# Patient Record
Sex: FEMALE | Race: OTHER | NOT HISPANIC OR LATINO | URBAN - METROPOLITAN AREA
[De-identification: names, ages, dates, MRNs, and addresses within clinical notes are randomized per-mention and may not be internally consistent; named-entity substitution may affect disease eponyms.]

---

## 2019-06-19 RX ORDER — CEFDINIR 250 MG/5ML
1 POWDER, FOR SUSPENSION ORAL
Qty: 0 | Refills: 0 | DISCHARGE
Start: 2019-06-19

## 2019-06-30 ENCOUNTER — INPATIENT (INPATIENT)
Facility: HOSPITAL | Age: 19
LOS: 3 days | Discharge: HOME | DRG: 392 | End: 2019-07-04
Attending: STUDENT IN AN ORGANIZED HEALTH CARE EDUCATION/TRAINING PROGRAM | Admitting: HOSPITALIST
Payer: MEDICAID

## 2019-06-30 VITALS
HEART RATE: 102 BPM | WEIGHT: 139.99 LBS | HEIGHT: 63 IN | DIASTOLIC BLOOD PRESSURE: 83 MMHG | RESPIRATION RATE: 22 BRPM | TEMPERATURE: 98 F | SYSTOLIC BLOOD PRESSURE: 119 MMHG

## 2019-06-30 LAB
ALBUMIN SERPL ELPH-MCNC: 5.1 G/DL — HIGH (ref 3.3–5)
ALP SERPL-CCNC: 69 U/L — SIGNIFICANT CHANGE UP (ref 40–120)
ALT FLD-CCNC: 50 U/L — HIGH (ref 10–45)
ANION GAP SERPL CALC-SCNC: 23 MMOL/L — HIGH (ref 5–17)
APTT BLD: 28.9 SEC — SIGNIFICANT CHANGE UP (ref 27.5–36.3)
AST SERPL-CCNC: 30 U/L — SIGNIFICANT CHANGE UP (ref 10–40)
BASOPHILS # BLD AUTO: 0.1 K/UL — SIGNIFICANT CHANGE UP (ref 0–0.2)
BASOPHILS NFR BLD AUTO: 0.4 % — SIGNIFICANT CHANGE UP (ref 0–2)
BILIRUB SERPL-MCNC: 0.8 MG/DL — SIGNIFICANT CHANGE UP (ref 0.2–1.2)
BUN SERPL-MCNC: 18 MG/DL — SIGNIFICANT CHANGE UP (ref 7–23)
CALCIUM SERPL-MCNC: 10 MG/DL — SIGNIFICANT CHANGE UP (ref 8.4–10.5)
CHLORIDE SERPL-SCNC: 96 MMOL/L — SIGNIFICANT CHANGE UP (ref 96–108)
CO2 SERPL-SCNC: 17 MMOL/L — LOW (ref 22–31)
CREAT SERPL-MCNC: 0.74 MG/DL — SIGNIFICANT CHANGE UP (ref 0.5–1.3)
EOSINOPHIL # BLD AUTO: 0.1 K/UL — SIGNIFICANT CHANGE UP (ref 0–0.5)
EOSINOPHIL NFR BLD AUTO: 0.4 % — SIGNIFICANT CHANGE UP (ref 0–6)
GAS PNL BLDV: SIGNIFICANT CHANGE UP
GLUCOSE SERPL-MCNC: 184 MG/DL — HIGH (ref 70–99)
HCT VFR BLD CALC: 42.8 % — SIGNIFICANT CHANGE UP (ref 34.5–45)
HGB BLD-MCNC: 14.8 G/DL — SIGNIFICANT CHANGE UP (ref 11.5–15.5)
HIV 1 & 2 AB SERPL IA.RAPID: SIGNIFICANT CHANGE UP
INR BLD: 1.26 RATIO — HIGH (ref 0.88–1.16)
LIDOCAIN IGE QN: 15 U/L — SIGNIFICANT CHANGE UP (ref 7–60)
LYMPHOCYTES # BLD AUTO: 10.1 % — LOW (ref 13–44)
LYMPHOCYTES # BLD AUTO: 2 K/UL — SIGNIFICANT CHANGE UP (ref 1–3.3)
MCHC RBC-ENTMCNC: 30.3 PG — SIGNIFICANT CHANGE UP (ref 27–34)
MCHC RBC-ENTMCNC: 34.6 GM/DL — SIGNIFICANT CHANGE UP (ref 32–36)
MCV RBC AUTO: 87.5 FL — SIGNIFICANT CHANGE UP (ref 80–100)
MONOCYTES # BLD AUTO: 1.3 K/UL — HIGH (ref 0–0.9)
MONOCYTES NFR BLD AUTO: 6.8 % — SIGNIFICANT CHANGE UP (ref 2–14)
NEUTROPHILS # BLD AUTO: 16.1 K/UL — HIGH (ref 1.8–7.4)
NEUTROPHILS NFR BLD AUTO: 82.3 % — HIGH (ref 43–77)
PLATELET # BLD AUTO: 388 K/UL — SIGNIFICANT CHANGE UP (ref 150–400)
POTASSIUM SERPL-MCNC: 3 MMOL/L — LOW (ref 3.5–5.3)
POTASSIUM SERPL-SCNC: 3 MMOL/L — LOW (ref 3.5–5.3)
PROT SERPL-MCNC: 8.3 G/DL — SIGNIFICANT CHANGE UP (ref 6–8.3)
PROTHROM AB SERPL-ACNC: 14.5 SEC — HIGH (ref 10–12.9)
RBC # BLD: 4.89 M/UL — SIGNIFICANT CHANGE UP (ref 3.8–5.2)
RBC # FLD: 11.8 % — SIGNIFICANT CHANGE UP (ref 10.3–14.5)
SODIUM SERPL-SCNC: 136 MMOL/L — SIGNIFICANT CHANGE UP (ref 135–145)
WBC # BLD: 19.6 K/UL — HIGH (ref 3.8–10.5)
WBC # FLD AUTO: 19.6 K/UL — HIGH (ref 3.8–10.5)

## 2019-06-30 PROCEDURE — 76705 ECHO EXAM OF ABDOMEN: CPT | Mod: 26,RT

## 2019-06-30 PROCEDURE — 99285 EMERGENCY DEPT VISIT HI MDM: CPT

## 2019-06-30 RX ORDER — KETOROLAC TROMETHAMINE 30 MG/ML
15 SYRINGE (ML) INJECTION ONCE
Refills: 0 | Status: DISCONTINUED | OUTPATIENT
Start: 2019-06-30 | End: 2019-06-30

## 2019-06-30 RX ORDER — ONDANSETRON 8 MG/1
4 TABLET, FILM COATED ORAL ONCE
Refills: 0 | Status: COMPLETED | OUTPATIENT
Start: 2019-06-30 | End: 2019-06-30

## 2019-06-30 RX ORDER — FAMOTIDINE 10 MG/ML
20 INJECTION INTRAVENOUS ONCE
Refills: 0 | Status: COMPLETED | OUTPATIENT
Start: 2019-06-30 | End: 2019-06-30

## 2019-06-30 RX ORDER — SODIUM CHLORIDE 9 MG/ML
2000 INJECTION, SOLUTION INTRAVENOUS ONCE
Refills: 0 | Status: COMPLETED | OUTPATIENT
Start: 2019-06-30 | End: 2019-06-30

## 2019-06-30 RX ORDER — LIDOCAINE 4 G/100G
10 CREAM TOPICAL ONCE
Refills: 0 | Status: COMPLETED | OUTPATIENT
Start: 2019-06-30 | End: 2019-06-30

## 2019-06-30 RX ORDER — ACETAMINOPHEN 500 MG
975 TABLET ORAL ONCE
Refills: 0 | Status: COMPLETED | OUTPATIENT
Start: 2019-06-30 | End: 2019-06-30

## 2019-06-30 RX ADMIN — Medication 15 MILLIGRAM(S): at 22:02

## 2019-06-30 RX ADMIN — Medication 30 MILLILITER(S): at 22:00

## 2019-06-30 RX ADMIN — SODIUM CHLORIDE 2000 MILLILITER(S): 9 INJECTION, SOLUTION INTRAVENOUS at 22:03

## 2019-06-30 RX ADMIN — Medication 975 MILLIGRAM(S): at 22:40

## 2019-06-30 RX ADMIN — Medication 975 MILLIGRAM(S): at 22:01

## 2019-06-30 RX ADMIN — LIDOCAINE 10 MILLILITER(S): 4 CREAM TOPICAL at 22:00

## 2019-06-30 RX ADMIN — Medication 15 MILLIGRAM(S): at 22:40

## 2019-06-30 RX ADMIN — FAMOTIDINE 20 MILLIGRAM(S): 10 INJECTION INTRAVENOUS at 22:01

## 2019-06-30 RX ADMIN — ONDANSETRON 4 MILLIGRAM(S): 8 TABLET, FILM COATED ORAL at 22:08

## 2019-06-30 NOTE — ED PROVIDER NOTE - ATTENDING CONTRIBUTION TO CARE
I performed a history and physical exam of the patient and discussed their management with the resident. I reviewed the resident's note and agree with the documented findings and plan of care.  Wendy Malik MD

## 2019-06-30 NOTE — ED ADULT NURSE NOTE - OBJECTIVE STATEMENT
18 year old female comes to the ED c/o sever abdominal pain. Patient has no pertinent PMH. Patient states she began to have a severe epigastric/RUQ abdominal pain that began this afternoon and has been becoming increasingly worse. Patient's pain is associated with nausea and vomiting as well. Patient states she is unable to tolerate PO food/drink. Patient has recently visited [facility] for the same chief complain and was told it was "food poisoning and a UTI." Patient placed on Keflex and states she is compliant with the medication regiment. Upon exam, patient is upset and uncomfortable in appearance, VSS, tenderness noted to the RUQ and epigastric region, ambulatory, able to move all extremities, follow commands and guarding her abdomen. Lung sounds are clear and equal b/l with no labored breathing,  abdomen is soft, nondistended and tender upon palpation, peripheral pulses are strong and equal b/l with no edema noted. Patient denies CP/SOB, dizziness/lightheadedness/vision changes, numbness/tingling/weakness, hematuria/dysuria/frequency.

## 2019-06-30 NOTE — ED PROVIDER NOTE - CARE PLAN
Principal Discharge DX:	Epigastric pain  Secondary Diagnosis:	UTI (urinary tract infection)  Secondary Diagnosis:	Nausea and vomiting

## 2019-06-30 NOTE — ED ADULT NURSE NOTE - NSIMPLEMENTINTERV_GEN_ALL_ED
Implemented All Universal Safety Interventions:  [place] to call system. Call bell, personal items and telephone within reach. Instruct patient to call for assistance. Room bathroom lighting operational. Non-slip footwear when patient is off stretcher. Physically safe environment: no spills, clutter or unnecessary equipment. Stretcher in lowest position, wheels locked, appropriate side rails in place.

## 2019-06-30 NOTE — ED PROVIDER NOTE - NS_ ATTENDINGSCRIBEDETAILS _ED_A_ED_FT
I performed a history and physical exam of the patient and discussed their management with the resident. I reviewed the scribe's note and agree with the documented findings and plan of care.  Wendy Malik MD

## 2019-06-30 NOTE — ED PROVIDER NOTE - OBJECTIVE STATEMENT
19 y/o F with no PMHx asthma, clamydia  c/o  epigastric intermittent  abd pain 4 month related with menstrual cycle. However the past 2 weeks  shes had the pain intermittent non related to menstruation. Pt is epigastric with N/V, worse with PO. Has been at [place] [facility] greater than 4x diagnosed as food borne illness within first 2 visit had CT with enlarged liver, told she has pyelo and started on abx ( unaware of name) .  Currently Abd pain episode started this afternoon epigastric non-radiating and similar to prior episode. Denies fever, chills, Chest pain, SOB, vaginal bleeding, vaginal discharge, diarrhea, vomiting non-bloody non billiasd no prior abd surgeries.

## 2019-06-30 NOTE — ED PROVIDER NOTE - CLINICAL SUMMARY MEDICAL DECISION MAKING FREE TEXT BOX
19 y/o F with epigastric abd pain, N/C with tenderness to palpitation pancreatitis, coelitis, gastritis . UA to eval for UTI, Urine culture if culture persistent pain non-localizable plan to do CT scan for further eval. 17 y/o F with epigastric abd pain, N/C with tenderness to palpitation pancreatitis, coelitis, gastritis . UA to eval for UTI, Urine culture if culture persistent pain non-localizable plan to do CT scan for further eval.  Wendy Malik MD  Plan: labs, IV fluids, UA, UCG, lipase, US GB, reassess.

## 2019-06-30 NOTE — ED ADULT NURSE NOTE - CHPI ED NUR SYMPTOMS NEG
no abdominal distension/no fever/no hematuria/no blood in stool/no burning urination/no diarrhea/no dysuria

## 2019-07-01 DIAGNOSIS — R10.13 EPIGASTRIC PAIN: ICD-10-CM

## 2019-07-01 DIAGNOSIS — N12 TUBULO-INTERSTITIAL NEPHRITIS, NOT SPECIFIED AS ACUTE OR CHRONIC: ICD-10-CM

## 2019-07-01 DIAGNOSIS — Z91.89 OTHER SPECIFIED PERSONAL RISK FACTORS, NOT ELSEWHERE CLASSIFIED: ICD-10-CM

## 2019-07-01 DIAGNOSIS — R11.2 NAUSEA WITH VOMITING, UNSPECIFIED: ICD-10-CM

## 2019-07-01 DIAGNOSIS — Z29.9 ENCOUNTER FOR PROPHYLACTIC MEASURES, UNSPECIFIED: ICD-10-CM

## 2019-07-01 DIAGNOSIS — E87.2 ACIDOSIS: ICD-10-CM

## 2019-07-01 DIAGNOSIS — R65.10 SYSTEMIC INFLAMMATORY RESPONSE SYNDROME (SIRS) OF NON-INFECTIOUS ORIGIN WITHOUT ACUTE ORGAN DYSFUNCTION: ICD-10-CM

## 2019-07-01 DIAGNOSIS — J45.909 UNSPECIFIED ASTHMA, UNCOMPLICATED: ICD-10-CM

## 2019-07-01 LAB
ANION GAP SERPL CALC-SCNC: 12 MMOL/L — SIGNIFICANT CHANGE UP (ref 5–17)
APPEARANCE UR: ABNORMAL
BACTERIA # UR AUTO: NEGATIVE — SIGNIFICANT CHANGE UP
BILIRUB UR-MCNC: NEGATIVE — SIGNIFICANT CHANGE UP
BUN SERPL-MCNC: 10 MG/DL — SIGNIFICANT CHANGE UP (ref 7–23)
CALCIUM SERPL-MCNC: 8.9 MG/DL — SIGNIFICANT CHANGE UP (ref 8.4–10.5)
CHLORIDE SERPL-SCNC: 101 MMOL/L — SIGNIFICANT CHANGE UP (ref 96–108)
CO2 SERPL-SCNC: 25 MMOL/L — SIGNIFICANT CHANGE UP (ref 22–31)
COLOR SPEC: YELLOW — SIGNIFICANT CHANGE UP
CREAT SERPL-MCNC: 0.6 MG/DL — SIGNIFICANT CHANGE UP (ref 0.5–1.3)
DIFF PNL FLD: NEGATIVE — SIGNIFICANT CHANGE UP
EPI CELLS # UR: 4 /HPF — SIGNIFICANT CHANGE UP
GAS PNL BLDV: SIGNIFICANT CHANGE UP
GLUCOSE SERPL-MCNC: 101 MG/DL — HIGH (ref 70–99)
GLUCOSE UR QL: NEGATIVE — SIGNIFICANT CHANGE UP
HCG UR QL: NEGATIVE — SIGNIFICANT CHANGE UP
HYALINE CASTS # UR AUTO: 13 /LPF — HIGH (ref 0–2)
KETONES UR-MCNC: ABNORMAL
LACTATE SERPL-SCNC: 1 MMOL/L — SIGNIFICANT CHANGE UP (ref 0.7–2)
LEUKOCYTE ESTERASE UR-ACNC: ABNORMAL
MAGNESIUM SERPL-MCNC: 1.7 MG/DL — SIGNIFICANT CHANGE UP (ref 1.6–2.6)
NITRITE UR-MCNC: NEGATIVE — SIGNIFICANT CHANGE UP
PCP SPEC-MCNC: SIGNIFICANT CHANGE UP
PH UR: 8 — SIGNIFICANT CHANGE UP (ref 5–8)
PHOSPHATE SERPL-MCNC: 3.1 MG/DL — SIGNIFICANT CHANGE UP (ref 2.5–4.5)
POTASSIUM SERPL-MCNC: 3.2 MMOL/L — LOW (ref 3.5–5.3)
POTASSIUM SERPL-SCNC: 3.2 MMOL/L — LOW (ref 3.5–5.3)
PROT UR-MCNC: ABNORMAL
RBC CASTS # UR COMP ASSIST: 8 /HPF — HIGH (ref 0–4)
SODIUM SERPL-SCNC: 138 MMOL/L — SIGNIFICANT CHANGE UP (ref 135–145)
SP GR SPEC: 1.03 — HIGH (ref 1.01–1.02)
UROBILINOGEN FLD QL: NEGATIVE — SIGNIFICANT CHANGE UP
WBC UR QL: 95 /HPF — HIGH (ref 0–5)

## 2019-07-01 PROCEDURE — 99223 1ST HOSP IP/OBS HIGH 75: CPT | Mod: GC

## 2019-07-01 PROCEDURE — 76830 TRANSVAGINAL US NON-OB: CPT | Mod: 26

## 2019-07-01 PROCEDURE — 76856 US EXAM PELVIC COMPLETE: CPT | Mod: 26,59

## 2019-07-01 PROCEDURE — 93010 ELECTROCARDIOGRAM REPORT: CPT

## 2019-07-01 PROCEDURE — 74177 CT ABD & PELVIS W/CONTRAST: CPT | Mod: 26

## 2019-07-01 PROCEDURE — 93975 VASCULAR STUDY: CPT | Mod: 26

## 2019-07-01 RX ORDER — PANTOPRAZOLE SODIUM 20 MG/1
40 TABLET, DELAYED RELEASE ORAL
Refills: 0 | Status: DISCONTINUED | OUTPATIENT
Start: 2019-07-01 | End: 2019-07-04

## 2019-07-01 RX ORDER — MORPHINE SULFATE 50 MG/1
4 CAPSULE, EXTENDED RELEASE ORAL ONCE
Refills: 0 | Status: DISCONTINUED | OUTPATIENT
Start: 2019-07-01 | End: 2019-07-01

## 2019-07-01 RX ORDER — IPRATROPIUM/ALBUTEROL SULFATE 18-103MCG
3 AEROSOL WITH ADAPTER (GRAM) INHALATION EVERY 6 HOURS
Refills: 0 | Status: DISCONTINUED | OUTPATIENT
Start: 2019-07-01 | End: 2019-07-04

## 2019-07-01 RX ORDER — POTASSIUM CHLORIDE 20 MEQ
10 PACKET (EA) ORAL
Refills: 0 | Status: COMPLETED | OUTPATIENT
Start: 2019-07-01 | End: 2019-07-01

## 2019-07-01 RX ORDER — SODIUM CHLORIDE 9 MG/ML
1000 INJECTION INTRAMUSCULAR; INTRAVENOUS; SUBCUTANEOUS
Refills: 0 | Status: DISCONTINUED | OUTPATIENT
Start: 2019-07-01 | End: 2019-07-02

## 2019-07-01 RX ORDER — SODIUM CHLORIDE 9 MG/ML
1000 INJECTION, SOLUTION INTRAVENOUS ONCE
Refills: 0 | Status: COMPLETED | OUTPATIENT
Start: 2019-07-01 | End: 2019-07-01

## 2019-07-01 RX ORDER — KETOROLAC TROMETHAMINE 30 MG/ML
15 SYRINGE (ML) INJECTION ONCE
Refills: 0 | Status: DISCONTINUED | OUTPATIENT
Start: 2019-07-01 | End: 2019-07-01

## 2019-07-01 RX ORDER — CEFPODOXIME PROXETIL 100 MG
200 TABLET ORAL
Refills: 0 | Status: DISCONTINUED | OUTPATIENT
Start: 2019-07-01 | End: 2019-07-04

## 2019-07-01 RX ORDER — PANTOPRAZOLE SODIUM 20 MG/1
1 TABLET, DELAYED RELEASE ORAL
Qty: 0 | Refills: 0 | DISCHARGE

## 2019-07-01 RX ORDER — MAGNESIUM SULFATE 500 MG/ML
2 VIAL (ML) INJECTION ONCE
Refills: 0 | Status: COMPLETED | OUTPATIENT
Start: 2019-07-01 | End: 2019-07-01

## 2019-07-01 RX ORDER — NICOTINE POLACRILEX 2 MG
1 GUM BUCCAL DAILY
Refills: 0 | Status: DISCONTINUED | OUTPATIENT
Start: 2019-07-01 | End: 2019-07-04

## 2019-07-01 RX ORDER — POTASSIUM CHLORIDE 20 MEQ
40 PACKET (EA) ORAL EVERY 4 HOURS
Refills: 0 | Status: COMPLETED | OUTPATIENT
Start: 2019-07-01 | End: 2019-07-02

## 2019-07-01 RX ADMIN — Medication 15 MILLIGRAM(S): at 08:06

## 2019-07-01 RX ADMIN — SODIUM CHLORIDE 1000 MILLILITER(S): 9 INJECTION, SOLUTION INTRAVENOUS at 03:20

## 2019-07-01 RX ADMIN — Medication 100 MILLIEQUIVALENT(S): at 21:50

## 2019-07-01 RX ADMIN — MORPHINE SULFATE 4 MILLIGRAM(S): 50 CAPSULE, EXTENDED RELEASE ORAL at 01:51

## 2019-07-01 RX ADMIN — SODIUM CHLORIDE 150 MILLILITER(S): 9 INJECTION INTRAMUSCULAR; INTRAVENOUS; SUBCUTANEOUS at 13:46

## 2019-07-01 RX ADMIN — Medication 40 MILLIEQUIVALENT(S): at 18:27

## 2019-07-01 RX ADMIN — Medication 50 GRAM(S): at 13:47

## 2019-07-01 RX ADMIN — PANTOPRAZOLE SODIUM 40 MILLIGRAM(S): 20 TABLET, DELAYED RELEASE ORAL at 13:45

## 2019-07-01 RX ADMIN — MORPHINE SULFATE 4 MILLIGRAM(S): 50 CAPSULE, EXTENDED RELEASE ORAL at 02:00

## 2019-07-01 RX ADMIN — Medication 200 MILLIGRAM(S): at 21:50

## 2019-07-01 RX ADMIN — Medication 40 MILLIEQUIVALENT(S): at 13:46

## 2019-07-01 NOTE — H&P ADULT - ASSESSMENT
19 y/o F PMHx of migraines, mild intermittent asthma and Chlamydia infection (2 years ago) who presents with 4 months of intermittent epigastric pain.

## 2019-07-01 NOTE — CONSULT NOTE ADULT - ASSESSMENT
Aminata Leach is a 17 y/o woman w/ past hx significant for migraines who presents with 4 months of intermittent epigastric pain, nausea, and vomiting. Of note she has been hospitalized 5-6 times at numerous hospitals in the area over the past few months, most recently [facility] 2 weeks ago. Aminata Leach is a 17 y/o woman w/ past hx significant for migraines who presents with 4 months of intermittent epigastric pain, nausea, and vomiting of unclear etiology w/o any significant findings on EGD, gastric emptying study, or imaging. Of note she has been hospitalized 5-6 times at numerous hospitals in the area over the past few months, most recently [facility] 2 weeks ago, where she was treated for pyelonephritis.     At this time, the patient's GI symptoms are of unclear etiology. She has had an extensive workup between everything that has been done at outside hospitals and during this admission, which has been non-revelatory. She does have a leukocytosis (may be attributable to pyelonephritis but has been on antibiotics albeit intermittently for a couple weeks now) and mildly elevated LFTs, pointing to some organic process, though the most common GI causes (cholecystitis, pancreatitis, gastroparesis) have been effectively ruled out. Other etiologies that could explain her symptoms include mastocytosis, lead toxicity, hereditary angioedema, or porphyria. At this time, would recommend completing imaging workup with HIDA scan and sending labs to evaluate for more rare etiologies. Furthermore, her symptoms were initially associated with her menstrual periods, which could point to endometriosis.    Recs:  -HIDA scan  -Blood tests: Lead, tTG IgA, C1 esterase inhibitor, tryptase, uroporphyrinogen decarboxylase  -Consider GYN consult re: endometriosis  -continue PPI Aminata Leach is a 17 y/o woman w/ past hx significant for migraines who presents with 4 months of intermittent epigastric pain, nausea, and vomiting of unclear etiology w/o any significant findings on EGD, gastric emptying study, or imaging. Of note she has been hospitalized 5-6 times at numerous hospitals in the area over the past few months, most recently [facility] 2 weeks ago, where she was treated for pyelonephritis.     At this time, the patient's GI symptoms are of unclear etiology. She has had an extensive workup between everything that has been done at outside hospitals and during this admission, which has been non-revelatory. She does have a leukocytosis (may be attributable to pyelonephritis but has been on antibiotics albeit intermittently for a couple weeks now) and mildly elevated LFTs, pointing to some organic process, though the most common GI causes (cholecystitis, pancreatitis, gastroparesis) have been effectively ruled out. Other etiologies that could explain her symptoms include mastocytosis, lead toxicity, hereditary angioedema, or porphyria. At this time, would recommend completing imaging workup with HIDA scan and sending labs to evaluate for more rare etiologies. Furthermore, her symptoms were initially associated with her menstrual periods, which could point to endometriosis.      Recommend:  -Obtain [facility] HIDA to rule out biliary dyskinesia  -Check urine porphobilinogen, serum lead, C1 esterase, C3, C4, tryptase, transglutaminase antibodies with IgA  -Obtain records from [place] and [facility] for review  -Finish course of antibiotics for pyelonephritis  -Continue to hold marijuana use  -If all the above are negative, also consider GYN consultation for endometritosis or GYN causes, as pain correlated with menses

## 2019-07-01 NOTE — H&P ADULT - PROBLEM SELECTOR PLAN 7
No PCP per patient  [facility]:  [phone] No PCP per patient  [facility]:  [phone]

## 2019-07-01 NOTE — H&P ADULT - PROBLEM SELECTOR PLAN 5
For HR and Leukocytosis.  -No clear source of infection. Symptoms IMPROVE score 0. SCDs for DVT ppx    #FEN  -S/p 3L LR in ED. C/w NS at 150 cc/hr.   -Replete lytes prn.  -Regular diet. Mild intermittent.   -Duonebs prn.

## 2019-07-01 NOTE — CONSULT NOTE ADULT - ATTENDING COMMENTS
Patient seen and examined. Agree with above. Patient has had 4 months of abdominal pain in the epigastrium associated with nausea/vomiting, started and worse with menses. She has had negative EGD, CT/ultrasound of abdomen, as well as gastric emptying study. Given the slight elevation in ALT along with the pain, check [facility] HIDA to rule out biliary dyskinesia. Check the above serological and urine workup for rare causes of abdominal pain. Obtain records for review from [place] and [facility].     If the above are negative, consider GYN consultation.

## 2019-07-01 NOTE — H&P ADULT - PROBLEM SELECTOR PLAN 2
Mild intermittent.   -Duonebs prn. Patient has been on PO antibiotics for outpatient treatment of pyelonephritis. Currently no CVA tenderness and no signs of recurrent pyelonephritis or abscess on CT a/p with IV contrast.   -Will continue alternative to cefdinir for completion of regimen. Stop date added.

## 2019-07-01 NOTE — H&P ADULT - NSHPPHYSICALEXAM_GEN_ALL_CORE
T(C): 36.8 (07-01-19 @ 10:30), Max: 37.2 (07-01-19 @ 09:24)  T(F): 98.2 (07-01-19 @ 10:30), Max: 98.9 (07-01-19 @ 09:24)  HR: 95 (07-01-19 @ 10:30) (87 - 109)  BP: 101/65 (07-01-19 @ 10:30) (101/65 - 140/89)  RR: 18 (07-01-19 @ 10:30) (16 - 24)  SpO2: 99% (07-01-19 @ 10:30) (96% - 100%)    CONSTITUTIONAL: No acute distress. Speaking in full sentences.   HEENT:  Head atraumatic. Conjunctiva clear B/L. Nasal mucosa normal. Moist oral mucosa. No posterior pharyngeal lesions noted.  Cardiovascular: RRR with no murmurs. No JVD noted. No lower extremitiy edema B/L.  Respiratory: Lungs CTAB. No accessory muscle use. Speaking in full sentences.  Gastrointestinal:  Soft, nontender. Non-distended. Non-rigid. No CVA tenderness B/L.  MSK:  No joint swelling. No joint erythema B/L. No midline spinal tenderness.  Vascular: Radial pulses 2+ B/L. Dorsalis pedis pulses 2+ B/L.  Neurologic:  Alert and awake. Oriented x3. Moving all extremities. Following commands. Making eye contact. No focal deficits.  Skin:  No rashes noted. No skin erythema noted. Extremities warm and well perfused throughout.  Psych:  Normal affect. Normal Mood.

## 2019-07-01 NOTE — H&P ADULT - NSHPSOCIALHISTORY_GEN_ALL_CORE
Used to smoke marijuana habitually. Smokes "6 blunts a day for years"  Smokes 5-6 cigarettes a week for about 2 years  Denies alcohol use.

## 2019-07-01 NOTE — CONSULT NOTE ADULT - SUBJECTIVE AND OBJECTIVE BOX
Mulugeta Escobar   PGY-1    Chief Complaint:  Patient is a 18y old  Female who presents with a chief complaint of Epigastric pain, Nausea, Vomiting, and  Intolerance of PO (2019 12:22)    Reason for consult: evaluate cause of abdominal pain and vomiting    HPI:  Aminata Leach is a 19 y/o woman w/ past hx significant for migraines who presents with 4 months of intermittent epigastric pain, nausea, and vomiting. Of note she has been hospitalized 5-6 times at numerous hospitals in the area over the past few months, most recently [facility] 2 weeks ago. During that admission, she was noted to have pyelonephritis and discharged on po antibiotics.     Ms. Leach states that the pain had been associated with her menstrual periods, but for the past month her symptoms have been more persistent, lasting for anywhere from a few hours to days. Prior to 4 months ago, she had never experienced similar episodes, which are typified by non-radiating epigastric pain that starts as mild, slowly worsens, and ultimately causes nausea and vomiting. She sometimes notes food from the previous day in her vomit and denies ever seeing blood. Her appetite has been poor, and food/drink make her symptoms worse. She has not been able to keep much down over the past month and has lost around 15 lbs.     At [facility], she reports that her workup included an EGD and gastric emptying study, both of which were normal. She reported smoking marijuana frequently and was told that she could have cannabis hyperemesis syndrome or another cyclic vomiting syndrome. Since that time around 2 weeks ago, she has not smoked any marijuana. She denies alcohol or other drug use.     Workup on this admission has included CT abdomen/pelvis, US RUQ, US pelvis, and transvaginal US, all of which have been unremarkable.              Allergies:  tramadol (Drowsiness)      Home Medications:    Hospital Medications:  ALBUTerol/ipratropium for Nebulization 3 milliLiter(s) Nebulizer every 6 hours PRN  cefpodoxime 200 milliGRAM(s) Oral two times a day  pantoprazole    Tablet 40 milliGRAM(s) Oral before breakfast  potassium chloride    Tablet ER 40 milliEquivalent(s) Oral every 4 hours  potassium chloride  10 mEq/100 mL IVPB 10 milliEquivalent(s) IV Intermittent every 1 hour  sodium chloride 0.9%. 1000 milliLiter(s) IV Continuous <Continuous>      PMHX/PSHX:  Chlamydia  Asthma  No significant past surgical history      Family history:  No significant family history      Social History:     ROS:     General:  No wt loss, fevers, chills, night sweats, fatigue,   Eyes:  Good vision, no reported pain  ENT:  No sore throat, pain, runny nose, dysphagia  CV:  No pain, palpitations, hypo/hypertension  Resp:  No dyspnea, cough, tachypnea, wheezing  GI:  See HPI  :  No pain, bleeding, incontinence, nocturia  Muscle:  No pain, weakness  Neuro:  No weakness, tingling, memory problems  Psych:  No fatigue, insomnia, mood problems, depression  Endocrine:  No polyuria, polydipsia, cold/heat intolerance  Heme:  No petechiae, ecchymosis, easy bruisability  Skin:  No rash, edema      PHYSICAL EXAM:     GENERAL:  Appears stated age, well-groomed, well-nourished, no distress  HEENT:  NC/AT,  conjunctivae clear and pink,  no JVD  CHEST:  Full & symmetric excursion, no increased effort, breath sounds clear  HEART:  Regular rhythm, S1, S2, no murmur/rub/S3/S4, no abdominal bruit, no edema  ABDOMEN:  Soft, non-tender, non-distended, normoactive bowel sounds,  no masses ,  EXTREMITIES:  no cyanosis,clubbing or edema  SKIN:  No rash/erythema/ecchymoses/petechiae/wounds/abscess/warm/dry  NEURO:  Alert, oriented    Vital Signs:  Vital Signs Last 24 Hrs  T(C): 36.8 (2019 10:30), Max: 37.2 (2019 09:24)  T(F): 98.2 (2019 10:30), Max: 98.9 (2019 09:24)  HR: 95 (2019 10:30) (87 - 109)  BP: 101/65 (2019 10:30) (101/65 - 140/89)  BP(mean): --  RR: 18 (2019 10:30) (16 - 24)  SpO2: 99% (2019 10:30) (96% - 100%)  Daily Height in cm: 160.02 (2019 10:30)    Daily     LABS:                        14.8   19.6  )-----------( 388      ( 2019 22:32 )             42.8     07-    138  |  101  |  10  ----------------------------<  101<H>  3.2<L>   |  25  |  0.60    Ca    8.9      2019 13:45  Phos  3.1     07-  Mg     1.7         TPro  8.3  /  Alb  5.1<H>  /  TBili  0.8  /  DBili  x   /  AST  30  /  ALT  50<H>  /  AlkPhos  69  06-30    LIVER FUNCTIONS - ( 2019 22:32 )  Alb: 5.1 g/dL / Pro: 8.3 g/dL / ALK PHOS: 69 U/L / ALT: 50 U/L / AST: 30 U/L / GGT: x           PT/INR - ( 2019 22:32 )   PT: 14.5 sec;   INR: 1.26 ratio         PTT - ( 2019 22:32 )  PTT:28.9 sec  Urinalysis Basic - ( 2019 00:21 )    Color: Yellow / Appearance: Slightly Turbid / S.033 / pH: x  Gluc: x / Ketone: Large  / Bili: Negative / Urobili: Negative   Blood: x / Protein: 30 mg/dL / Nitrite: Negative   Leuk Esterase: Large / RBC: 8 /hpf / WBC 95 /HPF   Sq Epi: x / Non Sq Epi: 4 /hpf / Bacteria: Negative      Amylase Serum--      Lipase serum15       Ammonia--      Imaging: Mulugeta Escobar   PGY-1    Chief Complaint:  Patient is a 18y old  Female who presents with a chief complaint of Epigastric pain, Nausea, Vomiting, and  Intolerance of PO (2019 12:22)    Reason for consult: evaluate cause of abdominal pain and vomiting    HPI:  Aminata Leach is a 19 y/o woman w/ past hx significant for migraines who presents with 4 months of intermittent epigastric pain, nausea, and vomiting. Of note she has been hospitalized 5-6 times at numerous hospitals in the area over the past few months, most recently [facility] 2 weeks ago. During that admission, she was noted to have pyelonephritis and discharged on po antibiotics.     Ms. Leach states that the pain had been associated with her menstrual periods, but for the past month her symptoms have been more persistent, lasting for anywhere from a few hours to days. Prior to 4 months ago, she had never experienced similar episodes, which are typified by non-radiating epigastric pain that starts as mild, slowly worsens, and ultimately causes nausea and vomiting. She sometimes notes food from the previous day in her vomit and denies ever seeing blood. Her appetite has been poor, and food/drink make her symptoms worse. She has not been able to keep much down over the past month and has lost around 15 lbs.     At [facility], she reports that her workup included an EGD and gastric emptying study, both of which were normal. She reported smoking marijuana frequently and was told that she could have cannabis hyperemesis syndrome or another cyclic vomiting syndrome. Since that time around 2 weeks ago, she has not smoked any marijuana. She denies alcohol or other drug use. Hot showers do not improve her symptoms.     Workup on this admission has included CT abdomen/pelvis, US RUQ, US pelvis, and transvaginal US, all of which have been unremarkable.              Allergies:  tramadol (Drowsiness)      Home Medications:    Hospital Medications:  ALBUTerol/ipratropium for Nebulization 3 milliLiter(s) Nebulizer every 6 hours PRN  cefpodoxime 200 milliGRAM(s) Oral two times a day  pantoprazole    Tablet 40 milliGRAM(s) Oral before breakfast  potassium chloride    Tablet ER 40 milliEquivalent(s) Oral every 4 hours  potassium chloride  10 mEq/100 mL IVPB 10 milliEquivalent(s) IV Intermittent every 1 hour  sodium chloride 0.9%. 1000 milliLiter(s) IV Continuous <Continuous>      PMHX/PSHX:  Chlamydia  Asthma  No significant past surgical history      Family history:  No significant family history      Social History:   -stopped smoking marijuana 2 weeks ago    ROS:     General:  + wt loss, +chills,    Eyes:  Good vision, no reported pain  ENT:  No sore throat, pain, runny nose, dysphagia  CV:  No pain, palpitations, hypo/hypertension  Resp:  No dyspnea, cough, tachypnea, wheezing  GI:  See HPI  :  No pain, bleeding, incontinence, nocturia  MSK:  no pain, noweakness  Neuro:  numbness in limbs rarely, No weakness, tingling, memory problems  Psych:  +anxiety, No fatigue, insomnia, mood problems, depression  Endocrine:  No polyuria, polydipsia, cold/heat intolerance  Heme:  No petechiae, ecchymosis, easy bruisability  Skin:  No rash, edema      PHYSICAL EXAM:     GENERAL:  Appears stated age, well-groomed, well-nourished, no distress  HEENT:  NC/AT,  conjunctivae clear and pink  CHEST:  Full & symmetric excursion, no increased effort, breath sounds clear  HEART:  Regular rhythm, S1, S2, no murmur/rub/S3/S4, no abdominal bruit, no edema  ABDOMEN:  + epigastric tenderness to palpation, Soft, non-distended, normoactive bowel sounds,  no masses ,  EXTREMITIES:  no cyanosis,clubbing or edema  SKIN:  No rash/erythema/ecchymoses/petechiae/wounds/abscess/warm/dry  NEURO:  Alert, oriented    Vital Signs:  Vital Signs Last 24 Hrs  T(C): 36.8 (2019 10:30), Max: 37.2 (2019 09:24)  T(F): 98.2 (2019 10:30), Max: 98.9 (2019 09:24)  HR: 95 (2019 10:30) (87 - 109)  BP: 101/65 (2019 10:30) (101/65 - 140/89)  BP(mean): --  RR: 18 (2019 10:30) (16 - 24)  SpO2: 99% (2019 10:30) (96% - 100%)  Daily Height in cm: 160.02 (2019 10:30)    Daily     LABS:                        14.8   19.6  )-----------( 388      ( 2019 22:32 )             42.8         138  |  101  |  10  ----------------------------<  101<H>  3.2<L>   |  25  |  0.60    Ca    8.9      2019 13:45  Phos  3.1       Mg     1.7         TPro  8.3  /  Alb  5.1<H>  /  TBili  0.8  /  DBili  x   /  AST  30  /  ALT  50<H>  /  AlkPhos  69      LIVER FUNCTIONS - ( 2019 22:32 )  Alb: 5.1 g/dL / Pro: 8.3 g/dL / ALK PHOS: 69 U/L / ALT: 50 U/L / AST: 30 U/L / GGT: x           PT/INR - ( 2019 22:32 )   PT: 14.5 sec;   INR: 1.26 ratio         PTT - ( 2019 22:32 )  PTT:28.9 sec  Urinalysis Basic - ( 2019 00:21 )    Color: Yellow / Appearance: Slightly Turbid / S.033 / pH: x  Gluc: x / Ketone: Large  / Bili: Negative / Urobili: Negative   Blood: x / Protein: 30 mg/dL / Nitrite: Negative   Leuk Esterase: Large / RBC: 8 /hpf / WBC 95 /HPF   Sq Epi: x / Non Sq Epi: 4 /hpf / Bacteria: Negative      Amylase Serum--      Lipase serum15       Ammonia--      Imaging:      FINDINGS:     CT ABD/Pelvis  LOWER CHEST: Within normal limits.    LIVER: Within normal limits.  BILE DUCTS: Normal caliber.  GALLBLADDER: Within normal limits.  SPLEEN: Within normal limits.  PANCREAS: Within normal limits.  ADRENALS: Within normal limits.  KIDNEYS/URETERS: Within normal limits.    BLADDER: Within normal limits.  REPRODUCTIVE ORGANS: Involuting left corpus luteum.    BOWEL: No bowel obstruction.  Normal appendix.  PERITONEUM: Small amount of pelvic free fluid.  VESSELS:  Within normal limits.  RETROPERITONEUM: No lymphadenopathy.    ABDOMINAL WALL: Within normal limits.  BONES: Within normal limits.    IMPRESSION:     No CT evidence of bowel obstruction, active inflammatory process or   transabdominal source for infection.    Involuting left corpus luteum.  Small amount of pelvic free fluid. Mulugeta Escobar   PGY-1    Chief Complaint:  Patient is a 18y old  Female who presents with a chief complaint of Epigastric pain, Nausea, Vomiting, and  Intolerance of PO (2019 12:22)    Reason for consult: evaluate cause of abdominal pain and vomiting    HPI:  Aminata Leach is a 17 y/o woman w/ past hx significant for migraines who presents with 4 months of intermittent epigastric pain, nausea, and vomiting. Of note she has been hospitalized 5-6 times at numerous hospitals in the area over the past few months, most recently [facility] 2 weeks ago. During that admission, she was noted to have pyelonephritis and discharged on po antibiotics.     Ms. Leach states that the pain had been associated with her menstrual periods, but for the past month her symptoms have been more persistent, lasting for anywhere from a few hours to days. Prior to 4 months ago, she had never experienced similar episodes, which are typified by non-radiating epigastric pain that starts as mild, slowly worsens, and ultimately causes nausea and vomiting. She sometimes notes food from the previous day in her vomit and denies ever seeing blood. Her appetite has been poor, and food/drink make her symptoms worse. She has not been able to keep much down over the past month and has lost around 15 lbs.  She endores that the pain first started at the same time of her menses, and over the last 4 months whenever she had a period the pain and nausea was worse.    At [facility], she reports that her workup included an EGD and gastric emptying study, both of which were normal. She reported smoking marijuana frequently and was told that she could have cannabis hyperemesis syndrome or another cyclic vomiting syndrome. Since that time around 3 weeks ago, she has not smoked any marijuana. She denies alcohol or other drug use. Hot showers do not improve her symptoms.     Workup on this admission has included CT abdomen/pelvis, US RUQ, US pelvis, and transvaginal US, all of which have been unremarkable.              Allergies:  tramadol (Drowsiness)      Home Medications:    Hospital Medications:  ALBUTerol/ipratropium for Nebulization 3 milliLiter(s) Nebulizer every 6 hours PRN  cefpodoxime 200 milliGRAM(s) Oral two times a day  pantoprazole    Tablet 40 milliGRAM(s) Oral before breakfast  potassium chloride    Tablet ER 40 milliEquivalent(s) Oral every 4 hours  potassium chloride  10 mEq/100 mL IVPB 10 milliEquivalent(s) IV Intermittent every 1 hour  sodium chloride 0.9%. 1000 milliLiter(s) IV Continuous <Continuous>      PMHX/PSHX:  Chlamydia  Asthma  No significant past surgical history      Family history:  No significant family history      Social History:   -stopped smoking marijuana 2 weeks ago  -No ETOH or smoking    ROS:     General:  + wt loss, +chills,    Eyes:  Good vision, no reported pain  ENT:  No sore throat, pain, runny nose, dysphagia  CV:  No pain, palpitations, hypo/hypertension  Resp:  No dyspnea, cough, tachypnea, wheezing  GI:  See HPI  :  No pain, bleeding, incontinence, nocturia  MSK:  no pain, noweakness  Neuro:  numbness in limbs rarely, No weakness, tingling, memory problems  Psych:  +anxiety, No fatigue, insomnia, mood problems, depression  Endocrine:  No polyuria, polydipsia, cold/heat intolerance  Heme:  No petechiae, ecchymosis, easy bruisability  Skin:  No rash, edema      PHYSICAL EXAM:     GENERAL:  Appears stated age, well-groomed, well-nourished, no distress  HEENT:  NC/AT,  conjunctivae clear and pink  CHEST:  Full & symmetric excursion, no increased effort, breath sounds clear  HEART:  Regular rhythm, S1, S2, no murmur  ABDOMEN:  + epigastric tenderness to palpation, Soft, non-distended, normoactive bowel sounds,  no masses, no rebound/guarding  EXTREMITIES:  no cyanosis,clubbing or edema  SKIN:  No rash/erythema/ecchymoses/petechiae/wounds/abscess/warm/dry  NEURO:  Alert, oriented x 3  PSYCH: Normal affect    Vital Signs:  Vital Signs Last 24 Hrs  T(C): 36.8 (2019 10:30), Max: 37.2 (2019 09:24)  T(F): 98.2 (2019 10:30), Max: 98.9 (2019 09:24)  HR: 95 (2019 10:30) (87 - 109)  BP: 101/65 (2019 10:30) (101/65 - 140/89)  BP(mean): --  RR: 18 (2019 10:30) (16 - 24)  SpO2: 99% (2019 10:30) (96% - 100%)  Daily Height in cm: 160.02 (2019 10:30)    Daily     LABS:                        14.8   19.6  )-----------( 388      ( 2019 22:32 )             42.8     07-    138  |  101  |  10  ----------------------------<  101<H>  3.2<L>   |  25  |  0.60    Ca    8.9      2019 13:45  Phos  3.1     -  Mg     1.7         TPro  8.3  /  Alb  5.1<H>  /  TBili  0.8  /  DBili  x   /  AST  30  /  ALT  50<H>  /  AlkPhos  69  06-30    LIVER FUNCTIONS - ( 2019 22:32 )  Alb: 5.1 g/dL / Pro: 8.3 g/dL / ALK PHOS: 69 U/L / ALT: 50 U/L / AST: 30 U/L / GGT: x           PT/INR - ( 2019 22:32 )   PT: 14.5 sec;   INR: 1.26 ratio         PTT - ( 2019 22:32 )  PTT:28.9 sec  Urinalysis Basic - ( 2019 00:21 )    Color: Yellow / Appearance: Slightly Turbid / S.033 / pH: x  Gluc: x / Ketone: Large  / Bili: Negative / Urobili: Negative   Blood: x / Protein: 30 mg/dL / Nitrite: Negative   Leuk Esterase: Large / RBC: 8 /hpf / WBC 95 /HPF   Sq Epi: x / Non Sq Epi: 4 /hpf / Bacteria: Negative      Amylase Serum--      Lipase serum15       Ammonia--      Imaging:      FINDINGS:     CT ABD/Pelvis  LOWER CHEST: Within normal limits.    LIVER: Within normal limits.  BILE DUCTS: Normal caliber.  GALLBLADDER: Within normal limits.  SPLEEN: Within normal limits.  PANCREAS: Within normal limits.  ADRENALS: Within normal limits.  KIDNEYS/URETERS: Within normal limits.    BLADDER: Within normal limits.  REPRODUCTIVE ORGANS: Involuting left corpus luteum.    BOWEL: No bowel obstruction.  Normal appendix.  PERITONEUM: Small amount of pelvic free fluid.  VESSELS:  Within normal limits.  RETROPERITONEUM: No lymphadenopathy.    ABDOMINAL WALL: Within normal limits.  BONES: Within normal limits.    IMPRESSION:     No CT evidence of bowel obstruction, active inflammatory process or   transabdominal source for infection.    Involuting left corpus luteum.  Small amount of pelvic free fluid.

## 2019-07-01 NOTE — H&P ADULT - NSHPLABSRESULTS_GEN_ALL_CORE
LABS:                        14.8   19.6  )-----------( 388      ( 2019 22:32 )             42.8         136  |  96  |  18  ----------------------------<  184<H>  3.0<L>   |  17<L>  |  0.74    Ca    10.0      2019 22:32  Mg     1.6         TPro  8.3  /  Alb  5.1<H>  /  TBili  0.8  /  DBili  x   /  AST  30  /  ALT  50<H>  /  AlkPhos  69  -    PT/INR - ( 2019 22:32 )   PT: 14.5 sec;   INR: 1.26 ratio      PTT - ( 2019 22:32 )  PTT:28.9 sec  Urinalysis Basic - ( 2019 00:21 )    Color: Yellow / Appearance: Slightly Turbid / S.033 / pH: x  Gluc: x / Ketone: Large  / Bili: Negative / Urobili: Negative   Blood: x / Protein: 30 mg/dL / Nitrite: Negative   Leuk Esterase: Large / RBC: 8 /hpf / WBC 95 /HPF   Sq Epi: x / Non Sq Epi: 4 /hpf / Bacteria: Negative    RADIOLOGIST ASSESSMENT OF FOLLOWING STUDIES REVIEWED:  -CT a/p with IV contrast  -TVUS with doppler  -RUQ sonogram.

## 2019-07-01 NOTE — H&P ADULT - PROBLEM SELECTOR PLAN 6
IMPROVE score 0. SCDs for DVT ppx    #FEN  -S/p 3L LR in ED. C/w NS at 150 cc/hr.   -Replete lytes prn.  -Regular diet. No PCP per patient  [facility]:  [phone]

## 2019-07-01 NOTE — H&P ADULT - HISTORY OF PRESENT ILLNESS
19 y/o F PMHx of Dr. Bin Vazquez,   Division of [facility]  [phone]    19 y/o F PMHx of migraines, mild intermittent asthma and Chlamydia infection (2 years ago) who presents with 4 months of intermittent epigastric pain. The pain typically is associated with menstrual periods, but for the past 1 month she has had daily intermittent epigastric pain with nausea, vomiting, and intolerance of oral intake. The pain can last from 4 hours to a whole day. The epigastric pain is sharp, nonradiating, worse with food, and improved with "pain medication." She has been to [facility] twice in the past 2 weeks because of the symptoms. She notes that her "potassium was low" and that her "lactic acid was high" during those admissions. The last visit was within 1 week. She reports that she was found to have a kidney infection. She was given Cefdinir 300 mg po BID for 11 days started on 6/19/19, however the patient reports starting it late. She also had an upper endoscopy during her last admission which she states was "normal." She was prescribed Pantoprazole however she did not fill it. The patient was a heavy cannabis user who smokes about "6 blunts" every day for years. She stopped one month ago but her symptoms still persisted. Currently, patient feels well again. Denies any more epigastric pain, nausea, or vomiting. She is hungry. Denies any chest pain, shortness of breath, abdominal pain, dysuria, frequency, back pain, headache, or blurry vision,

## 2019-07-01 NOTE — H&P ADULT - PROBLEM SELECTOR PLAN 3
Mild intermittent.   -Duonebs prn. Secondary to lactic acidosis and starvation ketosis   -S/p 3L LR in ED. C/w NS at 150 cc/hr.   -Trend lactate to clearance. For HR and Leukocytosis.  -No clear source of infection. Symptoms have resolved. ROS currently negative. CT a/p with IV contrast, TVUS, RUQ sonogram have no elucidated a source of infection.   -Patient with improvement in symptoms without antibiotics. Will continue home Cephalosporin alone for now.   -Will obtain Bcx and Ucx.

## 2019-07-01 NOTE — H&P ADULT - NSHPREVIEWOFSYSTEMS_GEN_ALL_CORE
CONSTITUTIONAL: No fevers or chills  EYES/ENT: No visual changes. No discharge from eyes. No vertigo. No throat pain. No dysphagia.  NECK: No pain or stiffness or rigidity.  RESPIRATORY: No cough, wheezing, or hemoptysis. No shortness of breath.  CARDIOVASCULAR: No chest pain or palpitations.   GASTROINTESTINAL: No more abdominal pain. No more nausea, vomiting. No hematemesis; No diarrhea or constipation. No melena or hematochezia.  GENITOURINARY: No dysuria, hesitancy, frequency or hematuria.  NEUROLOGICAL: No numbness or weakness. No change in speech.  MUSKULOSKELETAL: No joint swelling or erythema. No back pain.  SKIN: No itching or rashes.   PSYCH: Normal affect. Normal mood.    Review of systems negative except for items noted above.

## 2019-07-01 NOTE — ED ADULT NURSE REASSESSMENT NOTE - NS ED NURSE REASSESS COMMENT FT1
Report received from Rosa ENCINAS, VS repeated. Patient c/o ABD pain rated 10/10, Elmer LOPES made aware. Pt pending admit bed, admitted

## 2019-07-02 LAB
ALBUMIN SERPL ELPH-MCNC: 3.4 G/DL — SIGNIFICANT CHANGE UP (ref 3.3–5)
ALP SERPL-CCNC: 48 U/L — SIGNIFICANT CHANGE UP (ref 40–120)
ALT FLD-CCNC: 25 U/L — SIGNIFICANT CHANGE UP (ref 10–45)
ANION GAP SERPL CALC-SCNC: 11 MMOL/L — SIGNIFICANT CHANGE UP (ref 5–17)
AST SERPL-CCNC: 15 U/L — SIGNIFICANT CHANGE UP (ref 10–40)
BASOPHILS # BLD AUTO: 0.05 K/UL — SIGNIFICANT CHANGE UP (ref 0–0.2)
BASOPHILS NFR BLD AUTO: 0.6 % — SIGNIFICANT CHANGE UP (ref 0–2)
BILIRUB SERPL-MCNC: 0.4 MG/DL — SIGNIFICANT CHANGE UP (ref 0.2–1.2)
BLD GP AB SCN SERPL QL: NEGATIVE — SIGNIFICANT CHANGE UP
BUN SERPL-MCNC: 9 MG/DL — SIGNIFICANT CHANGE UP (ref 7–23)
C3 SERPL-MCNC: 92 MG/DL — SIGNIFICANT CHANGE UP (ref 81–157)
C4 SERPL-MCNC: 21 MG/DL — SIGNIFICANT CHANGE UP (ref 13–39)
CALCIUM SERPL-MCNC: 8.7 MG/DL — SIGNIFICANT CHANGE UP (ref 8.4–10.5)
CHLORIDE SERPL-SCNC: 109 MMOL/L — HIGH (ref 96–108)
CO2 SERPL-SCNC: 21 MMOL/L — LOW (ref 22–31)
CREAT SERPL-MCNC: 0.66 MG/DL — SIGNIFICANT CHANGE UP (ref 0.5–1.3)
CULTURE RESULTS: SIGNIFICANT CHANGE UP
EOSINOPHIL # BLD AUTO: 0.11 K/UL — SIGNIFICANT CHANGE UP (ref 0–0.5)
EOSINOPHIL NFR BLD AUTO: 1.3 % — SIGNIFICANT CHANGE UP (ref 0–6)
GLUCOSE SERPL-MCNC: 90 MG/DL — SIGNIFICANT CHANGE UP (ref 70–99)
HBA1C BLD-MCNC: 4.7 % — SIGNIFICANT CHANGE UP (ref 4–5.6)
HCT VFR BLD CALC: 34.8 % — SIGNIFICANT CHANGE UP (ref 34.5–45)
HGB BLD-MCNC: 11.1 G/DL — LOW (ref 11.5–15.5)
HIV 1+2 AB+HIV1 P24 AG SERPL QL IA: SIGNIFICANT CHANGE UP
IMM GRANULOCYTES NFR BLD AUTO: 0.5 % — SIGNIFICANT CHANGE UP (ref 0–1.5)
LYMPHOCYTES # BLD AUTO: 3.33 K/UL — HIGH (ref 1–3.3)
LYMPHOCYTES # BLD AUTO: 38.5 % — SIGNIFICANT CHANGE UP (ref 13–44)
MAGNESIUM SERPL-MCNC: 2 MG/DL — SIGNIFICANT CHANGE UP (ref 1.6–2.6)
MCHC RBC-ENTMCNC: 28.8 PG — SIGNIFICANT CHANGE UP (ref 27–34)
MCHC RBC-ENTMCNC: 31.9 GM/DL — LOW (ref 32–36)
MCV RBC AUTO: 90.4 FL — SIGNIFICANT CHANGE UP (ref 80–100)
MONOCYTES # BLD AUTO: 0.96 K/UL — HIGH (ref 0–0.9)
MONOCYTES NFR BLD AUTO: 11.1 % — SIGNIFICANT CHANGE UP (ref 2–14)
NEUTROPHILS # BLD AUTO: 4.17 K/UL — SIGNIFICANT CHANGE UP (ref 1.8–7.4)
NEUTROPHILS NFR BLD AUTO: 48 % — SIGNIFICANT CHANGE UP (ref 43–77)
PLATELET # BLD AUTO: 295 K/UL — SIGNIFICANT CHANGE UP (ref 150–400)
POTASSIUM SERPL-MCNC: 4.1 MMOL/L — SIGNIFICANT CHANGE UP (ref 3.5–5.3)
POTASSIUM SERPL-SCNC: 4.1 MMOL/L — SIGNIFICANT CHANGE UP (ref 3.5–5.3)
PROT SERPL-MCNC: 5.8 G/DL — LOW (ref 6–8.3)
RBC # BLD: 3.85 M/UL — SIGNIFICANT CHANGE UP (ref 3.8–5.2)
RBC # FLD: 13 % — SIGNIFICANT CHANGE UP (ref 10.3–14.5)
RH IG SCN BLD-IMP: POSITIVE — SIGNIFICANT CHANGE UP
SODIUM SERPL-SCNC: 141 MMOL/L — SIGNIFICANT CHANGE UP (ref 135–145)
SPECIMEN SOURCE: SIGNIFICANT CHANGE UP
TSH SERPL-MCNC: 1.06 UIU/ML — SIGNIFICANT CHANGE UP (ref 0.27–4.2)
WBC # BLD: 8.66 K/UL — SIGNIFICANT CHANGE UP (ref 3.8–10.5)
WBC # FLD AUTO: 8.66 K/UL — SIGNIFICANT CHANGE UP (ref 3.8–10.5)

## 2019-07-02 PROCEDURE — 99232 SBSQ HOSP IP/OBS MODERATE 35: CPT | Mod: GC

## 2019-07-02 RX ORDER — SODIUM CHLORIDE 9 MG/ML
1000 INJECTION INTRAMUSCULAR; INTRAVENOUS; SUBCUTANEOUS
Refills: 0 | Status: DISCONTINUED | OUTPATIENT
Start: 2019-07-02 | End: 2019-07-03

## 2019-07-02 RX ORDER — METOCLOPRAMIDE HCL 10 MG
10 TABLET ORAL ONCE
Refills: 0 | Status: COMPLETED | OUTPATIENT
Start: 2019-07-02 | End: 2019-07-02

## 2019-07-02 RX ORDER — KETOROLAC TROMETHAMINE 30 MG/ML
15 SYRINGE (ML) INJECTION ONCE
Refills: 0 | Status: DISCONTINUED | OUTPATIENT
Start: 2019-07-02 | End: 2019-07-02

## 2019-07-02 RX ORDER — ACETAMINOPHEN 500 MG
1000 TABLET ORAL ONCE
Refills: 0 | Status: DISCONTINUED | OUTPATIENT
Start: 2019-07-02 | End: 2019-07-02

## 2019-07-02 RX ORDER — ONDANSETRON 8 MG/1
4 TABLET, FILM COATED ORAL EVERY 6 HOURS
Refills: 0 | Status: DISCONTINUED | OUTPATIENT
Start: 2019-07-02 | End: 2019-07-04

## 2019-07-02 RX ORDER — MORPHINE SULFATE 50 MG/1
2 CAPSULE, EXTENDED RELEASE ORAL ONCE
Refills: 0 | Status: DISCONTINUED | OUTPATIENT
Start: 2019-07-02 | End: 2019-07-02

## 2019-07-02 RX ADMIN — MORPHINE SULFATE 2 MILLIGRAM(S): 50 CAPSULE, EXTENDED RELEASE ORAL at 10:39

## 2019-07-02 RX ADMIN — Medication 200 MILLIGRAM(S): at 06:01

## 2019-07-02 RX ADMIN — MORPHINE SULFATE 2 MILLIGRAM(S): 50 CAPSULE, EXTENDED RELEASE ORAL at 11:09

## 2019-07-02 RX ADMIN — Medication 10 MILLIGRAM(S): at 10:40

## 2019-07-02 RX ADMIN — Medication 15 MILLIGRAM(S): at 15:30

## 2019-07-02 RX ADMIN — PANTOPRAZOLE SODIUM 40 MILLIGRAM(S): 20 TABLET, DELAYED RELEASE ORAL at 06:01

## 2019-07-02 RX ADMIN — Medication 15 MILLIGRAM(S): at 09:05

## 2019-07-02 RX ADMIN — SODIUM CHLORIDE 150 MILLILITER(S): 9 INJECTION INTRAMUSCULAR; INTRAVENOUS; SUBCUTANEOUS at 06:01

## 2019-07-02 RX ADMIN — Medication 15 MILLIGRAM(S): at 16:00

## 2019-07-02 RX ADMIN — Medication 15 MILLIGRAM(S): at 08:35

## 2019-07-02 RX ADMIN — ONDANSETRON 4 MILLIGRAM(S): 8 TABLET, FILM COATED ORAL at 14:44

## 2019-07-02 RX ADMIN — ONDANSETRON 4 MILLIGRAM(S): 8 TABLET, FILM COATED ORAL at 08:35

## 2019-07-02 RX ADMIN — SODIUM CHLORIDE 75 MILLILITER(S): 9 INJECTION INTRAMUSCULAR; INTRAVENOUS; SUBCUTANEOUS at 16:08

## 2019-07-02 RX ADMIN — Medication 200 MILLIGRAM(S): at 17:17

## 2019-07-02 RX ADMIN — Medication 40 MILLIEQUIVALENT(S): at 00:18

## 2019-07-02 NOTE — CONSULT NOTE ADULT - REASON FOR ADMISSION
Epigastric pain.  Nausea  Vomiting  Intolerance of PO

## 2019-07-02 NOTE — CONSULT NOTE ADULT - SUBJECTIVE AND OBJECTIVE BOX
Mulugeta Escobar   Internal Medicine PGY-1  [id number]      PATIENT:  AMINATA MARCIAL  [id number]      SUMMARY:  Aminata Marcial is a 19 y/o woman w/ past hx significant for migraines who presents with 4 months of intermittent epigastric pain, nausea, and vomiting of unclear etiology w/o any significant findings on EGD, gastric emptying study, or imaging, now pending HIDA-scan and further blood workup. Of note she has been hospitalized 5-6 times at numerous hospitals in the area over the past few months, most recently [facility] 2 weeks ago, where she was treated for pyelonephritis.         INTERVAL HISTORY/OVERNIGHT EVENTS:  -continues to have abdominal pain, nausea, and vomiting - unable to get HIDA scan  -UA positive +marijuana    SUBJECTIVE:  -continued abd pain, nausea/vomiting    OBJECTIVE:    T(C): 36.5 (19 @ 08:32), Max: 37.2 (19 @ 17:55)  HR: 95 (19 @ 08:32) (87 - 99)  BP: 131/85 (19 @ 08:32) (93/60 - 131/85)  RR: 18 (19 @ 08:32) (18 - 18)  SpO2: 100% (19 @ 08:32) (97% - 100%)      19 @ 07:01  -  19 @ 07:00  --------------------------------------------------------  IN: 120 mL / OUT: 600 mL / NET: -480 mL            PHYSICAL EXAMINATION:  GENERAL:  Appears stated age, well-groomed, well-nourished, no distress  HEENT:  NC/AT,  conjunctivae clear and pink  CHEST:  Full & symmetric excursion, no increased effort, breath sounds clear  HEART:  Regular rhythm, S1, S2, no murmur  ABDOMEN:  + epigastric tenderness to palpation, Soft, non-distended, normoactive bowel sounds,  no masses, no rebound/guarding  EXTREMITIES:  no cyanosis,clubbing or edema  SKIN:  No rash/erythema/ecchymoses/petechiae/wounds/abscess/warm/dry  NEURO:  Alert, oriented x 3  PSYCH: Normal affect      LABS:                          11.1   8.66  )-----------( 295      ( 2019 09:54 )             34.8     07-02    141  |  109<H>  |  9   ----------------------------<  90  4.1   |  21<L>  |  0.66    Ca    8.7      2019 07:12  Phos  3.1     07-  Mg     2.0     07-    TPro  5.8<L>  /  Alb  3.4  /  TBili  0.4  /  DBili  x   /  AST  15  /  ALT  25  /  AlkPhos  48  07-    LIVER FUNCTIONS - ( 2019 07:12 )  Alb: 3.4 g/dL / Pro: 5.8 g/dL / ALK PHOS: 48 U/L / ALT: 25 U/L / AST: 15 U/L / GGT: x           PT/INR - ( 2019 22:32 )   PT: 14.5 sec;   INR: 1.26 ratio         PTT - ( 2019 22:32 )  PTT:28.9 sec    CARDIAC MARKERS ( 2019 22:32 )  x     / x     / 50 U/L / x     / 1.0 ng/mL      Urinalysis Basic - ( 2019 00:21 )    Color: Yellow / Appearance: Slightly Turbid / S.033 / pH: x  Gluc: x / Ketone: Large  / Bili: Negative / Urobili: Negative   Blood: x / Protein: 30 mg/dL / Nitrite: Negative   Leuk Esterase: Large / RBC: 8 /hpf / WBC 95 /HPF   Sq Epi: x / Non Sq Epi: 4 /hpf / Bacteria: Negative        IMAGIN/1 CT ABD/Pelvis  LOWER CHEST: Within normal limits.    LIVER: Within normal limits.  BILE DUCTS: Normal caliber.  GALLBLADDER: Within normal limits.  SPLEEN: Within normal limits.  PANCREAS: Within normal limits.  ADRENALS: Within normal limits.  KIDNEYS/URETERS: Within normal limits.    BLADDER: Within normal limits.  REPRODUCTIVE ORGANS: Involuting left corpus luteum.    BOWEL: No bowel obstruction.  Normal appendix.  PERITONEUM: Small amount of pelvic free fluid.  VESSELS:  Within normal limits.  RETROPERITONEUM: No lymphadenopathy.    ABDOMINAL WALL: Within normal limits.  BONES: Within normal limits.    IMPRESSION:     No CT evidence of bowel obstruction, active inflammatory process or   transabdominal source for infection.    Involuting left corpus luteum.  Small amount of pelvic free fluid.      MEDICATIONS:  MEDICATIONS  (STANDING):  cefpodoxime 200 milliGRAM(s) Oral two times a day  nicotine -   7 mG/24Hr(s) Patch 1 patch Transdermal daily  pantoprazole    Tablet 40 milliGRAM(s) Oral before breakfast    MEDICATIONS  (PRN):  ALBUTerol/ipratropium for Nebulization 3 milliLiter(s) Nebulizer every 6 hours PRN Wheezing  ondansetron Injectable 4 milliGRAM(s) IV Push every 6 hours PRN Nausea and/or Vomiting

## 2019-07-02 NOTE — PROGRESS NOTE ADULT - SUBJECTIVE AND OBJECTIVE BOX
Mulugeta Escobar   Internal Medicine PGY-1  [id number]      PATIENT:  AMINATA MARCIAL  [id number]      SUMMARY:  Aminata Marcial is a 19 y/o woman w/ past hx significant for migraines who presents with 4 months of intermittent epigastric pain, nausea, and vomiting of unclear etiology w/o any significant findings on EGD, gastric emptying study, or imaging, now pending HIDA-scan and further blood workup. Of note she has been hospitalized 5-6 times at numerous hospitals in the area over the past few months, most recently [facility] 2 weeks ago, where she was treated for pyelonephritis.         INTERVAL HISTORY/OVERNIGHT EVENTS:  -continues to have abdominal pain, nausea, and vomiting - unable to get HIDA scan  -UA positive +marijuana    SUBJECTIVE:  -continued abd pain, nausea/vomiting    OBJECTIVE:    T(C): 36.5 (19 @ 08:32), Max: 37.2 (19 @ 17:55)  HR: 95 (19 @ 08:32) (87 - 99)  BP: 131/85 (19 @ 08:32) (93/60 - 131/85)  RR: 18 (19 @ 08:32) (18 - 18)  SpO2: 100% (19 @ 08:32) (97% - 100%)      19 @ 07:01  -  19 @ 07:00  --------------------------------------------------------  IN: 120 mL / OUT: 600 mL / NET: -480 mL            PHYSICAL EXAMINATION:  GENERAL:  Appears stated age, well-groomed, well-nourished, no distress  HEENT:  NC/AT,  conjunctivae clear and pink  CHEST:  Full & symmetric excursion, no increased effort, breath sounds clear  HEART:  Regular rhythm, S1, S2, no murmur  ABDOMEN:  + epigastric tenderness to palpation, Soft, non-distended, normoactive bowel sounds,  no masses, no rebound/guarding  EXTREMITIES:  no cyanosis,clubbing or edema  SKIN:  No rash/erythema/ecchymoses/petechiae/wounds/abscess/warm/dry  NEURO:  Alert, oriented x 3  PSYCH: Normal affect      LABS:                          11.1   8.66  )-----------( 295      ( 2019 09:54 )             34.8     07-02    141  |  109<H>  |  9   ----------------------------<  90  4.1   |  21<L>  |  0.66    Ca    8.7      2019 07:12  Phos  3.1     07-  Mg     2.0     07-    TPro  5.8<L>  /  Alb  3.4  /  TBili  0.4  /  DBili  x   /  AST  15  /  ALT  25  /  AlkPhos  48  07-    LIVER FUNCTIONS - ( 2019 07:12 )  Alb: 3.4 g/dL / Pro: 5.8 g/dL / ALK PHOS: 48 U/L / ALT: 25 U/L / AST: 15 U/L / GGT: x           PT/INR - ( 2019 22:32 )   PT: 14.5 sec;   INR: 1.26 ratio         PTT - ( 2019 22:32 )  PTT:28.9 sec    CARDIAC MARKERS ( 2019 22:32 )  x     / x     / 50 U/L / x     / 1.0 ng/mL      Urinalysis Basic - ( 2019 00:21 )    Color: Yellow / Appearance: Slightly Turbid / S.033 / pH: x  Gluc: x / Ketone: Large  / Bili: Negative / Urobili: Negative   Blood: x / Protein: 30 mg/dL / Nitrite: Negative   Leuk Esterase: Large / RBC: 8 /hpf / WBC 95 /HPF   Sq Epi: x / Non Sq Epi: 4 /hpf / Bacteria: Negative        IMAGIN/1 CT ABD/Pelvis  LOWER CHEST: Within normal limits.    LIVER: Within normal limits.  BILE DUCTS: Normal caliber.  GALLBLADDER: Within normal limits.  SPLEEN: Within normal limits.  PANCREAS: Within normal limits.  ADRENALS: Within normal limits.  KIDNEYS/URETERS: Within normal limits.    BLADDER: Within normal limits.  REPRODUCTIVE ORGANS: Involuting left corpus luteum.    BOWEL: No bowel obstruction.  Normal appendix.  PERITONEUM: Small amount of pelvic free fluid.  VESSELS:  Within normal limits.  RETROPERITONEUM: No lymphadenopathy.    ABDOMINAL WALL: Within normal limits.  BONES: Within normal limits.    IMPRESSION:     No CT evidence of bowel obstruction, active inflammatory process or   transabdominal source for infection.    Involuting left corpus luteum.  Small amount of pelvic free fluid.      MEDICATIONS:  MEDICATIONS  (STANDING):  cefpodoxime 200 milliGRAM(s) Oral two times a day  nicotine -   7 mG/24Hr(s) Patch 1 patch Transdermal daily  pantoprazole    Tablet 40 milliGRAM(s) Oral before breakfast    MEDICATIONS  (PRN):  ALBUTerol/ipratropium for Nebulization 3 milliLiter(s) Nebulizer every 6 hours PRN Wheezing  ondansetron Injectable 4 milliGRAM(s) IV Push every 6 hours PRN Nausea and/or Vomiting

## 2019-07-02 NOTE — PROGRESS NOTE ADULT - PROBLEM SELECTOR PLAN 3
Leukocytosis likely reactive vs hemoconcentration related - now resolved. HR improved.   -No clear source of infection. Symptoms have resolved. ROS currently negative. CT a/p with IV contrast, TVUS, RUQ sonogram have no elucidated a source of infection.   -Patient with improvement in symptoms without antibiotics. Will continue home Cephalosporin alone for now.   -F/u Bcx and Ucx.

## 2019-07-02 NOTE — CONSULT NOTE ADULT - ATTENDING COMMENTS
Patient seen and examined at bedside, agree with above assessment. Patient states that her epigastric pain is not associated with her menses, currently patient does not have menses and still has epigastric pain, patient denies any sx of endometriosis, denies heavy peroids, dysmenorrhea, dysparuenia. there is no indication for  gyn intervention at this time, please reconsult as necessary.

## 2019-07-02 NOTE — CONSULT NOTE ADULT - ASSESSMENT
Aminata Leach is a 17 y/o woman w/ past hx significant for migraines who presents with 4 months of intermittent epigastric pain, nausea, and vomiting of unclear etiology w/o any significant findings on EGD, gastric emptying study, or imaging. Of note she has been hospitalized 5-6 times at numerous hospitals in the area over the past few months, most recently [facility] 2 weeks ago, where she was treated for pyelonephritis.     At this time, the patient's GI symptoms are of unclear etiology. She has had an extensive workup between everything that has been done at outside hospitals and during this admission, which has been non-revelatory. She does have a leukocytosis (may be attributable to pyelonephritis but has been on antibiotics albeit intermittently for a couple weeks now) and mildly elevated LFTs, pointing to some organic process, though the most common GI causes (cholecystitis, pancreatitis, gastroparesis) have been effectively ruled out. Other etiologies that could explain her symptoms include mastocytosis, lead toxicity, hereditary angioedema, or porphyria. At this time, would recommend completing imaging workup with HIDA scan and sending labs to evaluate for more rare etiologies. Furthermore, her symptoms were initially associated with her menstrual periods, which could point to endometriosis.      Recommend:  -Obtain [facility] HIDA to rule out biliary dyskinesia  -Check urine porphobilinogen, serum lead, C1 esterase, C3, C4, tryptase, transglutaminase antibodies with IgA  -Obtain records from [place] and [facility] for review  -Finish course of antibiotics for pyelonephritis  -Continue to hold marijuana use  -If all the above are negative, also consider GYN consultation for endometritosis or GYN causes, as pain correlated with menses Aminata Leach is a 19 y/o woman w/ past hx significant for migraines who presents with 4 months of intermittent epigastric pain, nausea, and vomiting of unclear etiology w/o any significant findings on EGD, gastric emptying study, or imaging. Of note she has been hospitalized 5-6 times at numerous hospitals in the area over the past few months, most recently [facility] 2 weeks ago, where she was treated for pyelonephritis.     At this time, the patient's GI symptoms are of unclear etiology. She has had an extensive workup between everything that has been done at outside hospitals and during this admission, which has been non-revelatory. Leukocytosis and mildly elevated LFTs have now resolved, and TSH is wnl. It is unclear if she has an organic cause for her symptoms, as her blood tests have now normalized and she continues to be symptomatic, while the most common GI causes (cholecystitis, pancreatitis, gastroparesis) have been effectively ruled out. Other etiologies that could explain her symptoms include mastocytosis, lead toxicity, hereditary angioedema, or porphyria. At this time, would recommend completing imaging workup with HIDA scan and sending labs to evaluate for more rare etiologies. Furthermore, her symptoms were initially associated with her menstrual periods, which could point to endometriosis.      Recommend:  -Obtain [facility] HIDA to rule out biliary dyskinesia  -Check urine porphobilinogen, serum lead, C1 esterase, C3, C4, tryptase, transglutaminase antibodies with IgA  -Obtain records from [place] and [facility] for review  -Finish course of antibiotics for pyelonephritis  -Continue to hold marijuana use  -If all the above are negative, also consider GYN consultation for endometriosis or GYN causes, as pain correlated with menses

## 2019-07-02 NOTE — PROGRESS NOTE ADULT - SUBJECTIVE AND OBJECTIVE BOX
INTERNAL MEDICINE FOLLOW UP    Dr. Bin Vazquez DO  Hospitalists Division, [facility]    HPI:  Dr. Bin Vazquez,   Division of Hospital Medicine  [phone]    Patient seen and examined this AM.   Patient with recurrent epigastric pain, nausea, and vomiting. Thus unable to tolerate HIDA scan.   Denies fever, chills, back pain, or rash.     REVIEW OF SYSTEMS:  CONSTITUTIONAL: No fevers or chills  	EYES/ENT: No visual changes. No discharge from eyes. No vertigo. No throat pain. No dysphagia.  	NECK: No pain or stiffness or rigidity.  	RESPIRATORY: No cough, wheezing, or hemoptysis. No shortness of breath.  	CARDIOVASCULAR: No chest pain or palpitations.   	GASTROINTESTINAL: +abdominal pain. +nausea, +vomiting. No hematemesis; No diarrhea or constipation. No melena or hematochezia.  	GENITOURINARY: No dysuria, hesitancy, frequency or hematuria.  	NEUROLOGICAL: No numbness or weakness. No change in speech.  MUSCULOSKELETAL No joint swelling or erythema. No back pain.  	SKIN: No itching or rashes.   	PSYCH: Normal affect. Normal mood.    Review of systems negative except for items noted above.    PAST MEDICAL & SURGICAL HISTORY:  Chlamydia  Asthma  No significant past surgical history    MEDICATIONS  (STANDING):  cefpodoxime 200 milliGRAM(s) Oral two times a day  metoclopramide Injectable 10 milliGRAM(s) IV Push once  morphine  - Injectable 2 milliGRAM(s) IV Push once  nicotine -   7 mG/24Hr(s) Patch 1 patch Transdermal daily  pantoprazole    Tablet 40 milliGRAM(s) Oral before breakfast    MEDICATIONS  (PRN):  ALBUTerol/ipratropium for Nebulization 3 milliLiter(s) Nebulizer every 6 hours PRN Wheezing  ondansetron Injectable 4 milliGRAM(s) IV Push every 6 hours PRN Nausea and/or Vomiting    Allergies  tramadol (Drowsiness)    OBJECTIVE  T(C): 36.5 (19 @ 08:32), Max: 37.2 (19 @ 17:55)  T(F): 97.7 (19 @ 08:32), Max: 98.9 (19 @ 17:55)  HR: 95 (19 @ 08:32) (87 - 99)  BP: 131/85 (19 @ 08:32) (93/60 - 131/85)  RR: 18 (19 @ 08:32) (18 - 18)  SpO2: 100% (19 @ 08:32) (97% - 100%)    	CONSTITUTIONAL: Appears uncomfortable with nausea.. Speaking in full sentences.   	HEENT:  Head atraumatic. Conjunctiva clear B/L. Nasal mucosa normal. Moist oral mucosa. No posterior pharyngeal lesions noted.  	Cardiovascular: RRR with no murmurs. No JVD noted. No lower extremitiy edema B/L.  	Respiratory: Lungs CTAB. No accessory muscle use. Speaking in full sentences.  	Gastrointestinal:  Soft; Tenderness noted in the epigastric region and RUQ region on deep palpation. Non-distended. Non-rigid. Mild pain with carlos's punch to left CVA.   	MSK:  No joint swelling. No joint erythema B/L. No midline spinal tenderness.  	Vascular: Radial pulses 2+ B/L. Dorsalis pedis pulses 2+ B/L.  	Neurologic:  Alert and awake. Oriented x3. Moving all extremities. Following commands. Making eye contact. No focal deficits.  	Skin:  No rashes noted. No skin erythema noted. Extremities warm and well perfused throughout.  Psych:  Normal affect. Normal Mood.    LABS                        11.1   8.66  )-----------( 295      ( 2019 09:54 )             34.8     07-02    141  |  109<H>  |  9   ----------------------------<  90  4.1   |  21<L>  |  0.66    Ca    8.7      2019 07:12  Phos  3.1     07-01  Mg     2.0     07-02    TPro  5.8<L>  /  Alb  3.4  /  TBili  0.4  /  DBili  x   /  AST  15  /  ALT  25  /  AlkPhos  48  07-02    PT/INR - ( 2019 22:32 )   PT: 14.5 sec;   INR: 1.26 ratio         PTT - ( 2019 22:32 )  PTT:28.9 sec  Urinalysis Basic - ( 2019 00:21 )    Color: Yellow / Appearance: Slightly Turbid / S.033 / pH: x  Gluc: x / Ketone: Large  / Bili: Negative / Urobili: Negative   Blood: x / Protein: 30 mg/dL / Nitrite: Negative   Leuk Esterase: Large / RBC: 8 /hpf / WBC 95 /HPF   Sq Epi: x / Non Sq Epi: 4 /hpf / Bacteria: Negative    PERSONALLY REVIEWED RADIOLOGIST'S IMPRESSION:  -CT a/p with IV contrast  -TVUS with doppler  -RUQ sonogram. INTERNAL MEDICINE FOLLOW UP    Dr. Bin Vazquez, DO  [phone]    Patient seen and examined this AM.   Patient with recurrent epigastric pain, nausea, and vomiting. Thus unable to tolerate HIDA scan.   Denies fever, chills, back pain, or rash.     REVIEW OF SYSTEMS:  CONSTITUTIONAL: No fevers or chills  	EYES/ENT: No visual changes. No discharge from eyes. No vertigo. No throat pain. No dysphagia.  	NECK: No pain or stiffness or rigidity.  	RESPIRATORY: No cough, wheezing, or hemoptysis. No shortness of breath.  	CARDIOVASCULAR: No chest pain or palpitations.   	GASTROINTESTINAL: +abdominal pain. +nausea, +vomiting. No hematemesis; No diarrhea or constipation. No melena or hematochezia.  	GENITOURINARY: No dysuria, hesitancy, frequency or hematuria.  	NEUROLOGICAL: No numbness or weakness. No change in speech.  MUSCULOSKELETAL No joint swelling or erythema. No back pain.  	SKIN: No itching or rashes.   	PSYCH: Normal affect. Normal mood.    Review of systems negative except for items noted above.    PAST MEDICAL & SURGICAL HISTORY:  Chlamydia  Asthma  No significant past surgical history    MEDICATIONS  (STANDING):  cefpodoxime 200 milliGRAM(s) Oral two times a day  metoclopramide Injectable 10 milliGRAM(s) IV Push once  morphine  - Injectable 2 milliGRAM(s) IV Push once  nicotine -   7 mG/24Hr(s) Patch 1 patch Transdermal daily  pantoprazole    Tablet 40 milliGRAM(s) Oral before breakfast    MEDICATIONS  (PRN):  ALBUTerol/ipratropium for Nebulization 3 milliLiter(s) Nebulizer every 6 hours PRN Wheezing  ondansetron Injectable 4 milliGRAM(s) IV Push every 6 hours PRN Nausea and/or Vomiting    Allergies  tramadol (Drowsiness)    OBJECTIVE  T(C): 36.5 (19 @ 08:32), Max: 37.2 (19 @ 17:55)  T(F): 97.7 (19 @ 08:32), Max: 98.9 (19 @ 17:55)  HR: 95 (19 @ 08:32) (87 - 99)  BP: 131/85 (19 @ 08:32) (93/60 - 131/85)  RR: 18 (19 @ 08:32) (18 - 18)  SpO2: 100% (19 @ 08:32) (97% - 100%)    	CONSTITUTIONAL: Appears uncomfortable with nausea.. Speaking in full sentences.   	HEENT:  Head atraumatic. Conjunctiva clear B/L. Nasal mucosa normal. Moist oral mucosa. No posterior pharyngeal lesions noted.  	Cardiovascular: RRR with no murmurs. No JVD noted. No lower extremitiy edema B/L.  	Respiratory: Lungs CTAB. No accessory muscle use. Speaking in full sentences.  	Gastrointestinal:  Soft; Tenderness noted in the epigastric region and RUQ region on deep palpation. Non-distended. Non-rigid. Mild pain with carlos's punch to left CVA.   	MSK:  No joint swelling. No joint erythema B/L. No midline spinal tenderness.  	Vascular: Radial pulses 2+ B/L. Dorsalis pedis pulses 2+ B/L.  	Neurologic:  Alert and awake. Oriented x3. Moving all extremities. Following commands. Making eye contact. No focal deficits.  	Skin:  No rashes noted. No skin erythema noted. Extremities warm and well perfused throughout.  Psych:  Normal affect. Normal Mood.    LABS                        11.1   8.66  )-----------( 295      ( 2019 09:54 )             34.8     07-02    141  |  109<H>  |  9   ----------------------------<  90  4.1   |  21<L>  |  0.66    Ca    8.7      2019 07:12  Phos  3.1     07-  Mg     2.0     07-02    TPro  5.8<L>  /  Alb  3.4  /  TBili  0.4  /  DBili  x   /  AST  15  /  ALT  25  /  AlkPhos  48  07-02    PT/INR - ( 2019 22:32 )   PT: 14.5 sec;   INR: 1.26 ratio         PTT - ( 2019 22:32 )  PTT:28.9 sec  Urinalysis Basic - ( 2019 00:21 )    Color: Yellow / Appearance: Slightly Turbid / S.033 / pH: x  Gluc: x / Ketone: Large  / Bili: Negative / Urobili: Negative   Blood: x / Protein: 30 mg/dL / Nitrite: Negative   Leuk Esterase: Large / RBC: 8 /hpf / WBC 95 /HPF   Sq Epi: x / Non Sq Epi: 4 /hpf / Bacteria: Negative    PERSONALLY REVIEWED RADIOLOGIST'S IMPRESSION:  -CT a/p with IV contrast  -TVUS with doppler  -RUQ sonogram. INTERNAL MEDICINE FOLLOW UP    Dr. Bin Vazquez, DO  [phone]    Patient seen and examined this AM.   Patient with recurrent epigastric pain, nausea, and vomiting. Thus unable to tolerate HIDA scan.   Denies fever, chills, back pain, or rash.     REVIEW OF SYSTEMS:  CONSTITUTIONAL: No fevers or chills  	EYES/ENT: No visual changes. No discharge from eyes. No vertigo. No throat pain. No dysphagia.  	NECK: No pain or stiffness or rigidity.  	RESPIRATORY: No cough, wheezing, or hemoptysis. No shortness of breath.  	CARDIOVASCULAR: No chest pain or palpitations.   	GASTROINTESTINAL: +abdominal pain. +nausea, +vomiting. No hematemesis; No diarrhea or constipation. No melena or hematochezia.  	GENITOURINARY: No dysuria, hesitancy, frequency or hematuria.  	NEUROLOGICAL: No numbness or weakness. No change in speech.  MUSCULOSKELETAL No joint swelling or erythema. No back pain.  	SKIN: No itching or rashes.   	PSYCH: Normal affect. Normal mood.    Review of systems negative except for items noted above.    PAST MEDICAL & SURGICAL HISTORY:  Chlamydia  Asthma  No significant past surgical history    MEDICATIONS  (STANDING):  cefpodoxime 200 milliGRAM(s) Oral two times a day  metoclopramide Injectable 10 milliGRAM(s) IV Push once  morphine  - Injectable 2 milliGRAM(s) IV Push once  nicotine -   7 mG/24Hr(s) Patch 1 patch Transdermal daily  pantoprazole    Tablet 40 milliGRAM(s) Oral before breakfast    MEDICATIONS  (PRN):  ALBUTerol/ipratropium for Nebulization 3 milliLiter(s) Nebulizer every 6 hours PRN Wheezing  ondansetron Injectable 4 milliGRAM(s) IV Push every 6 hours PRN Nausea and/or Vomiting    Allergies  tramadol (Drowsiness)    OBJECTIVE  T(C): 36.5 (19 @ 08:32), Max: 37.2 (19 @ 17:55)  T(F): 97.7 (19 @ 08:32), Max: 98.9 (19 @ 17:55)  HR: 95 (19 @ 08:32) (87 - 99)  BP: 131/85 (19 @ 08:32) (93/60 - 131/85)  RR: 18 (19 @ 08:32) (18 - 18)  SpO2: 100% (19 @ 08:32) (97% - 100%)    	CONSTITUTIONAL: Appears uncomfortable with nausea.  	HEENT:  Head atraumatic. Conjunctiva clear B/L. Nasal mucosa normal. Moist oral mucosa. No posterior pharyngeal lesions noted.  	Cardiovascular: RRR with no murmurs. No JVD noted. No lower extremitiy edema B/L.  	Respiratory: Lungs CTAB. No accessory muscle use.   	Gastrointestinal:  Soft; Tenderness noted in the epigastric region and RUQ region on deep palpation. Non-distended. Non-rigid. Mild pain with carlos's punch to left CVA.   	MSK:  No joint swelling. No joint erythema B/L. No midline spinal tenderness.  	Vascular: Radial pulses 2+ B/L. Dorsalis pedis pulses 2+ B/L.  	Neurologic:  Alert and awake. Oriented x3. Moving all extremities. Following commands. Making eye contact. No focal deficits.  	Skin:  No rashes noted. No skin erythema noted. Extremities warm and well perfused throughout.  Psych:  Normal affect. Normal Mood.    LABS                        11.1   8.66  )-----------( 295      ( 2019 09:54 )             34.8     07-02    141  |  109<H>  |  9   ----------------------------<  90  4.1   |  21<L>  |  0.66    Ca    8.7      2019 07:12  Phos  3.1     07-  Mg     2.0     07-02    TPro  5.8<L>  /  Alb  3.4  /  TBili  0.4  /  DBili  x   /  AST  15  /  ALT  25  /  AlkPhos  48  07-02    PT/INR - ( 2019 22:32 )   PT: 14.5 sec;   INR: 1.26 ratio         PTT - ( 2019 22:32 )  PTT:28.9 sec  Urinalysis Basic - ( 2019 00:21 )    Color: Yellow / Appearance: Slightly Turbid / S.033 / pH: x  Gluc: x / Ketone: Large  / Bili: Negative / Urobili: Negative   Blood: x / Protein: 30 mg/dL / Nitrite: Negative   Leuk Esterase: Large / RBC: 8 /hpf / WBC 95 /HPF   Sq Epi: x / Non Sq Epi: 4 /hpf / Bacteria: Negative    PERSONALLY REVIEWED RADIOLOGIST'S IMPRESSION:  -CT a/p with IV contrast  -TVUS with doppler  -RUQ sonogram.

## 2019-07-02 NOTE — CONSULT NOTE ADULT - SUBJECTIVE AND OBJECTIVE BOX
GYNECOLOGIC CONSULT NOTE    18y G0 LMP , presented to the hospital c/o 4-month history of intermittent epigastric pain. Reports she initially thought this pain was associated with her menses for the first three months, but during this last month it has been occuring daily and has persisted beyond her LMP. She no longer believes her symptoms are associated with her menses. GYN was consulted for this reason and because other work-up has been negative. The pain is sharp, non-radiating, worse with meals and improved with oral analgesics. Associated w/ n/v, and inability to tolerate PO. She was seen at [facility] 2x in the prior 2 weeks for the same complaints.  Per chart review, as part of a work-up, the patient had an upper endoscopy with no abnormal findings. She was also prescribed Pantoprazole however she did not fill it. Denies CP/SOB, dysuria, frequency, pelvic pain.     GYN physician: none    OB/GYN History: regular periods, q30 days, last 5-7 days, not heavy; denies fibroids/cysts; h/o chlamydia infection status post treatment 2 yrs ago    Surgical History:    Endoscopy    Past Medical History:   Chlamydia  Asthma    Meds:   albuterol prn    All: NKDA     Family History:   No significant family history    Social History: regular marijuana user    REVIEW OF SYSTEMS:    CONSTITUTIONAL: No fever, weight loss, or fatigue  RESPIRATORY: No cough, wheezing, chills; No shortness of breath  CARDIOVASCULAR: No chest pain, palpitations, dizziness  GASTROINTESTINAL: +epigastric pain, +nausea; No hematemesis; No diarrhea or constipation  GENITOURINARY: No dysuria, frequency, hematuria, or incontinence  NEUROLOGICAL: No headaches  MUSCULOSKELETAL: No joint pain or swelling; No muscle, back, or extremity pain  PELVIC: No vaginal bleeding    MEDICATIONS  (STANDING):  cefpodoxime 200 milliGRAM(s) Oral two times a day  nicotine -   7 mG/24Hr(s) Patch 1 patch Transdermal daily  pantoprazole    Tablet 40 milliGRAM(s) Oral before breakfast  sodium chloride 0.9%. 1000 milliLiter(s) (75 mL/Hr) IV Continuous <Continuous>    MEDICATIONS  (PRN):  ALBUTerol/ipratropium for Nebulization 3 milliLiter(s) Nebulizer every 6 hours PRN Wheezing  ondansetron Injectable 4 milliGRAM(s) IV Push every 6 hours PRN Nausea and/or Vomiting      OBJECTIVE FINDINGS:    Vital Signs Last 24 Hrs  T(C): 37.2 (2019 12:40), Max: 37.2 (2019 12:40)  T(F): 98.9 (2019 12:40), Max: 98.9 (2019 12:40)  HR: 79 (2019 12:40) (79 - 95)  BP: 123/81 (2019 12:40) (93/60 - 131/85)  BP(mean): --  RR: 18 (2019 12:40) (18 - 18)  SpO2: 100% (2019 12:40) (97% - 100%)    PHYSICAL EXAM:    GENERAL: NAD  NERVOUS SYSTEM:  Alert & Oriented X3  ABDOMEN: Soft, +epigastric tenderness; Nondistended; No rebound, No guarding  PELVIC: Deferred      LABS:                        11.1   8.66  )-----------( 295      ( 2019 09:54 )             34.8     07-02    141  |  109<H>  |  9   ----------------------------<  90  4.1   |  21<L>  |  0.66    Ca    8.7      2019 07:12  Phos  3.1     07-01  Mg     2.0     07-02    TPro  5.8<L>  /  Alb  3.4  /  TBili  0.4  /  DBili  x   /  AST  15  /  ALT  25  /  AlkPhos  48  07-02    PT/INR - ( 2019 22:32 )   PT: 14.5 sec;   INR: 1.26 ratio         PTT - ( 2019 22:32 )  PTT:28.9 sec  Urinalysis Basic - ( 2019 00:21 )    Color: Yellow / Appearance: Slightly Turbid / S.033 / pH: x  Gluc: x / Ketone: Large  / Bili: Negative / Urobili: Negative   Blood: x / Protein: 30 mg/dL / Nitrite: Negative   Leuk Esterase: Large / RBC: 8 /hpf / WBC 95 /HPF   Sq Epi: x / Non Sq Epi: 4 /hpf / Bacteria: Negative      RADIOLOGY & ADDITIONAL STUDIES:    EXAM:  CT ABDOMEN AND PELVIS IC                          PROCEDURE DATE:  2019      INTERPRETATION:  CLINICAL INFORMATION: Epigastric and right upper   quadrant abdominal pain.    COMPARISON: Right upper quadrant ultrasound from 2019.    PROCEDURE:   CT of the Abdomen and Pelvis was performed with intravenous contrast.   Intravenous contrast: 90 ml Omnipaque 350. 10 ml discarded.  Oral contrast: None.  Sagittal and coronal reformats were performed.    FINDINGS:    LOWER CHEST: Within normal limits.    LIVER: Within normal limits.  BILE DUCTS: Normal caliber.  GALLBLADDER: Within normal limits.  SPLEEN: Within normal limits.  PANCREAS: Within normal limits.  ADRENALS: Within normal limits.  KIDNEYS/URETERS: Within normal limits.    BLADDER: Within normal limits.  REPRODUCTIVE ORGANS: Involuting left corpus luteum.    BOWEL: No bowel obstruction.  Normal appendix.  PERITONEUM: Small amount of pelvic free fluid.  VESSELS:  Within normal limits.  RETROPERITONEUM: No lymphadenopathy.    ABDOMINAL WALL: Within normal limits.  BONES: Within normal limits.    IMPRESSION:     No CT evidence of bowel obstruction, active inflammatory process or   transabdominal source for infection.    Involuting left corpus luteum.  Small amount of pelvic free fluid.  ---------------------------------  EXAM:  US PELVIC COMPLETE                          EXAM:  US TRANSVAGINAL                        EXAM:  US DPLX PELVIC                            PROCEDURE DATE:  2019        INTERPRETATION:  CLINICAL INFORMATION: Pelvic pain. Rule out torsion.    LMP: 2019.    COMPARISON: CT abdomen and pelvis earlier the same day.    TECHNIQUE:     Endovaginal and transabdominal pelvic sonogram. Color and Spectral   Doppler was performed.     FINDINGS:    Uterus: 5.9 x 3.0 x 4.2 cm. Within normal limits.    Endometrium: Mildly thickened up to 10 mm. Nonspecific echogenic material   within the endometrial canal, may represent blood products.    Right ovary: 3.5 x 2.2 x 2.1 cm. Within normal limits. Normal arterial   and venous waveforms.    Left ovary: 3.5 x 2.0 x 2.3 cm. Within normal limits. Normal arterial and   venous waveforms.    Fluid: Trace free fluid.    IMPRESSION:    No sonographic evidence of ovarian torsion.    Mildly echogenic material identified within the endometrial canal, which   may represent blood products. Correlate clinically.

## 2019-07-02 NOTE — CONSULT NOTE ADULT - ASSESSMENT
18y G0 LMP 6/18, presents w/ 4-month history of intermittent epigastric pain. Patient c/o persistent pain despite cessation of menses. Imaging unremarkable. No complaints concerning reproductive system.

## 2019-07-02 NOTE — PROGRESS NOTE ADULT - PROBLEM SELECTOR PLAN 2
Patient has been on PO antibiotics for outpatient treatment of pyelonephritis. No signs of recurrent pyelonephritis or abscess on CT a/p with IV contrast.   -Will continue alternative to cefdinir for completion of regimen. Stop date added.

## 2019-07-02 NOTE — PROGRESS NOTE ADULT - ASSESSMENT
Aminata Leach is a 17 y/o woman w/ past hx significant for migraines who presents with 4 months of intermittent epigastric pain, nausea, and vomiting of unclear etiology w/o any significant findings on EGD, gastric emptying study, or imaging. Of note she has been hospitalized 5-6 times at numerous hospitals in the area over the past few months, most recently [facility] 2 weeks ago, where she was treated for pyelonephritis.     At this time, the patient's GI symptoms are of unclear etiology. She has had an extensive workup between everything that has been done at outside hospitals and during this admission, which has been non-revelatory. Leukocytosis and mildly elevated LFTs have now resolved, and TSH is wnl. It is unclear if she has an organic cause for her symptoms, as her blood tests have now normalized and she continues to be symptomatic, while the most common GI causes (cholecystitis, pancreatitis, gastroparesis) have been effectively ruled out. Other etiologies that could explain her symptoms include mastocytosis, lead toxicity, hereditary angioedema, or porphyria. At this time, would recommend completing imaging workup with HIDA scan and sending labs to evaluate for more rare etiologies. Furthermore, her symptoms were initially associated with her menstrual periods, which could point to endometriosis.      Recommend:  -Obtain [facility] HIDA to rule out biliary dyskinesia  -Check urine porphobilinogen, serum lead, C1 esterase, C3, C4, tryptase, transglutaminase antibodies with IgA  -Obtain records from [place] and [facility] for review  -Avoid narcotics  -Continue to hold marijuana use  -Consider GYN consultation for endometriosis or GYN causes, as pain correlated with menses

## 2019-07-02 NOTE — PROGRESS NOTE ADULT - PROBLEM SELECTOR PLAN 6
IMPROVE score 0. SCDs for DVT ppx    #FEN  -S/p 3L LR in ED. C/w NS at 150 cc/hr.   -Replete lytes prn.  -Regular diet.

## 2019-07-02 NOTE — SBIRT NOTE ADULT - NSSBIRTDRGBRIEFINTDET_GEN_A_CORE
Reviewed score, patient states she no longer uses marijuana for one month now.   Patient smokes (tobacco): 2-3 cigarettes per day.  Resources declined

## 2019-07-02 NOTE — PROGRESS NOTE ADULT - PROBLEM SELECTOR PLAN 1
With associated epigastric pain. Unclear etiology at this point in time. No clear source of infection. CT a/p with IV contrast, TVUS, RUQ sonogram have no elucidated a source of infection. Endoscopy within 1 week per patient "normal," however PUD and H. Pylori a possibility. Lipase within normal limits. Patient has not been smoking marijuana (utox positive but patient prior heavy smoker). She does have a history of migraines which holds a associated with cyclic vomiting syndrome. Also considering gastroparesis. Endometriosis unlikely given lack of findings on TVUS -- no endometrial material noted outside endometrial canal. CNS associated disease unlikely given lack of other signs or symptoms. Given lack of risk factors, doubt cardiovascular cause of epigastric pain.   -Symptomatic management.  -F/u Bcx and Ucx.  -C/w pantoprazole.   -Hga1c pending.   -GI consult -- recommendations appreciated.   -For HIDA scan to rule out biliary dsykinesia. NPO at midnight. Will need to be off opiates for 24 hrs prior if possible.

## 2019-07-02 NOTE — CONSULT NOTE ADULT - PROBLEM SELECTOR RECOMMENDATION 9
-No acute GYN intervention required at this time  -Symptoms do not appear to be associated w/ the patient's menses; if there was a concern for endometriosis in the upper GI tract, we would expect the patient to present w/ hemoptysis and for the pain to resolve w/ cessation of menses   -Re-consult GYN team as needed for changes in clinical status    Patient seen and examined w/ Dr. Channing Acevedo PGY-2 -No acute GYN intervention required at this time  -Symptoms do not appear to be associated w/ the patient's menses; if there was a concern for endometriosis in the upper GI tract, we would expect for the pain to resolve w/ cessation of menses   -Re-consult GYN team as needed for changes in clinical status    Patient seen and examined w/ Dr. Channing Acevedo PGY-2

## 2019-07-03 LAB
-  AMPICILLIN: SIGNIFICANT CHANGE UP
-  CIPROFLOXACIN: SIGNIFICANT CHANGE UP
-  LEVOFLOXACIN: SIGNIFICANT CHANGE UP
-  NITROFURANTOIN: SIGNIFICANT CHANGE UP
-  TETRACYCLINE: SIGNIFICANT CHANGE UP
-  VANCOMYCIN: SIGNIFICANT CHANGE UP
ANION GAP SERPL CALC-SCNC: 11 MMOL/L — SIGNIFICANT CHANGE UP (ref 5–17)
BUN SERPL-MCNC: 9 MG/DL — SIGNIFICANT CHANGE UP (ref 7–23)
CALCIUM SERPL-MCNC: 8.7 MG/DL — SIGNIFICANT CHANGE UP (ref 8.4–10.5)
CHLORIDE SERPL-SCNC: 106 MMOL/L — SIGNIFICANT CHANGE UP (ref 96–108)
CO2 SERPL-SCNC: 22 MMOL/L — SIGNIFICANT CHANGE UP (ref 22–31)
CREAT SERPL-MCNC: 0.75 MG/DL — SIGNIFICANT CHANGE UP (ref 0.5–1.3)
CULTURE RESULTS: SIGNIFICANT CHANGE UP
GLUCOSE SERPL-MCNC: 87 MG/DL — SIGNIFICANT CHANGE UP (ref 70–99)
HCT VFR BLD CALC: 34.8 % — SIGNIFICANT CHANGE UP (ref 34.5–45)
HGB BLD-MCNC: 11.1 G/DL — LOW (ref 11.5–15.5)
LEAD BLD-MCNC: <1 UG/DL — SIGNIFICANT CHANGE UP (ref 0–4)
MCHC RBC-ENTMCNC: 28.9 PG — SIGNIFICANT CHANGE UP (ref 27–34)
MCHC RBC-ENTMCNC: 31.9 GM/DL — LOW (ref 32–36)
MCV RBC AUTO: 90.6 FL — SIGNIFICANT CHANGE UP (ref 80–100)
METHOD TYPE: SIGNIFICANT CHANGE UP
ORGANISM # SPEC MICROSCOPIC CNT: SIGNIFICANT CHANGE UP
ORGANISM # SPEC MICROSCOPIC CNT: SIGNIFICANT CHANGE UP
PLATELET # BLD AUTO: 302 K/UL — SIGNIFICANT CHANGE UP (ref 150–400)
POTASSIUM SERPL-MCNC: 3.4 MMOL/L — LOW (ref 3.5–5.3)
POTASSIUM SERPL-SCNC: 3.4 MMOL/L — LOW (ref 3.5–5.3)
RBC # BLD: 3.84 M/UL — SIGNIFICANT CHANGE UP (ref 3.8–5.2)
RBC # FLD: 12.9 % — SIGNIFICANT CHANGE UP (ref 10.3–14.5)
SODIUM SERPL-SCNC: 139 MMOL/L — SIGNIFICANT CHANGE UP (ref 135–145)
SPECIMEN SOURCE: SIGNIFICANT CHANGE UP
WBC # BLD: 8.76 K/UL — SIGNIFICANT CHANGE UP (ref 3.8–10.5)
WBC # FLD AUTO: 8.76 K/UL — SIGNIFICANT CHANGE UP (ref 3.8–10.5)

## 2019-07-03 PROCEDURE — 78227 HEPATOBIL SYST IMAGE W/DRUG: CPT | Mod: 26

## 2019-07-03 PROCEDURE — 99232 SBSQ HOSP IP/OBS MODERATE 35: CPT | Mod: GC

## 2019-07-03 PROCEDURE — 99232 SBSQ HOSP IP/OBS MODERATE 35: CPT

## 2019-07-03 RX ORDER — LIDOCAINE 4 G/100G
1 CREAM TOPICAL ONCE
Refills: 0 | Status: COMPLETED | OUTPATIENT
Start: 2019-07-03 | End: 2019-07-03

## 2019-07-03 RX ORDER — METOCLOPRAMIDE HCL 10 MG
10 TABLET ORAL ONCE
Refills: 0 | Status: COMPLETED | OUTPATIENT
Start: 2019-07-03 | End: 2019-07-03

## 2019-07-03 RX ORDER — ACETAMINOPHEN 500 MG
650 TABLET ORAL ONCE
Refills: 0 | Status: COMPLETED | OUTPATIENT
Start: 2019-07-03 | End: 2019-07-03

## 2019-07-03 RX ORDER — MORPHINE SULFATE 50 MG/1
1 CAPSULE, EXTENDED RELEASE ORAL ONCE
Refills: 0 | Status: DISCONTINUED | OUTPATIENT
Start: 2019-07-03 | End: 2019-07-03

## 2019-07-03 RX ORDER — POTASSIUM CHLORIDE 20 MEQ
40 PACKET (EA) ORAL ONCE
Refills: 0 | Status: COMPLETED | OUTPATIENT
Start: 2019-07-03 | End: 2019-07-03

## 2019-07-03 RX ORDER — SUMATRIPTAN SUCCINATE 4 MG/.5ML
6 INJECTION, SOLUTION SUBCUTANEOUS ONCE
Refills: 0 | Status: COMPLETED | OUTPATIENT
Start: 2019-07-03 | End: 2019-07-03

## 2019-07-03 RX ORDER — KETOROLAC TROMETHAMINE 30 MG/ML
15 SYRINGE (ML) INJECTION ONCE
Refills: 0 | Status: DISCONTINUED | OUTPATIENT
Start: 2019-07-03 | End: 2019-07-04

## 2019-07-03 RX ADMIN — Medication 40 MILLIEQUIVALENT(S): at 14:01

## 2019-07-03 RX ADMIN — SUMATRIPTAN SUCCINATE 6 MILLIGRAM(S): 4 INJECTION, SOLUTION SUBCUTANEOUS at 17:54

## 2019-07-03 RX ADMIN — Medication 200 MILLIGRAM(S): at 17:54

## 2019-07-03 RX ADMIN — MORPHINE SULFATE 1 MILLIGRAM(S): 50 CAPSULE, EXTENDED RELEASE ORAL at 15:14

## 2019-07-03 RX ADMIN — MORPHINE SULFATE 1 MILLIGRAM(S): 50 CAPSULE, EXTENDED RELEASE ORAL at 15:44

## 2019-07-03 RX ADMIN — LIDOCAINE 1 PATCH: 4 CREAM TOPICAL at 22:45

## 2019-07-03 RX ADMIN — Medication 200 MILLIGRAM(S): at 05:53

## 2019-07-03 RX ADMIN — Medication 10 MILLIGRAM(S): at 15:43

## 2019-07-03 RX ADMIN — PANTOPRAZOLE SODIUM 40 MILLIGRAM(S): 20 TABLET, DELAYED RELEASE ORAL at 05:53

## 2019-07-03 NOTE — PROGRESS NOTE ADULT - PROBLEM SELECTOR PLAN 1
With associated epigastric pain. Unclear etiology at this point in time. No clear source of infection. CT a/p with IV contrast, TVUS, RUQ sonogram have no elucidated a source of infection. Endoscopy within 1 week of admission per patient "normal," however PUD and H. Pylori a possibility. Lipase within normal limits. Patient has not been smoking marijuana (utox positive but patient prior heavy smoker). She does have a history of migraines which holds a associated with cyclic vomiting syndrome. Also considering gastroparesis. Endometriosis unlikely given lack of findings on TVUS -- no endometrial material noted outside endometrial canal. CNS associated disease unlikely given lack of other signs or symptoms. Given lack of risk factors, doubt cardiovascular cause of epigastric pain.   -Symptomatic management.  -F/u Bcx and Ucx.  -C/w pantoprazole.   -Hga1c WNL.   -GI consult -- recommendations appreciated. Final workup at the labs. Will follow up with them once labs finalized.   -HIDA scan negative for biliary dsykinesia.   -Gyn input appreciated -- unlikely the source.

## 2019-07-03 NOTE — DISCHARGE NOTE PROVIDER - HOSPITAL COURSE
17 y/o F PMHx of migraines, mild intermittent asthma and Chlamydia infection (2 years ago) who presents with 4 months of intermittent epigastric pain and n/v.         Nausea and vomiting.  Plan: With associated epigastric pain. Unclear etiology at this point in time. No clear source of infection. CT a/p with IV contrast, TVUS, RUQ sonogram have no elucidated a source of infection. Endoscopy within 1 week of admission per patient "normal," however PUD and H. Pylori a possibility. Lipase within normal limits. Patient has not been smoking marijuana (utox positive but patient prior heavy smoker). She does have a history of migraines which holds a associated with cyclic vomiting syndrome. Also considering gastroparesis. GYN consulted, endometriosis unlikely given lack of findings on TVUS -- no endometrial material noted outside endometrial canal. CNS associated disease unlikely given lack of other signs or symptoms. Given lack of risk factors, doubt cardiovascular cause of epigastric pain.     Symptomatic management, c/w pantoprazole, Toradol, and antiemetics.  GI consulted, HIDA scan negative for biliary dyskinesia, diet resumed     Patient has been on PO antibiotics for outpatient treatment of pyelonephritis. No signs of recurrent pyelonephritis or abscess on CT a/p with IV contrast.     Will continue alternative to cefdinir for completion of regimen. Stop date added. Metabolic acidosis: RESOLVED. Secondary to lactic acidosis and starvation ketosis s/p IVF 19 y/o F PMHx of migraines, mild intermittent asthma and Chlamydia infection (2 years ago) who presents with 4 months of intermittent epigastric pain and n/v.         Nausea and vomiting.  Plan: With associated epigastric pain. Unclear etiology at this point in time. No clear source of infection. CT a/p with IV contrast, TVUS, RUQ sonogram have no elucidated a source of infection. Endoscopy within 1 week of admission per patient "normal," however PUD and H. Pylori a possibility. Lipase within normal limits. Patient has not been smoking marijuana (utox positive but patient prior heavy smoker). She does have a history of migraines which holds a associated with cyclic vomiting syndrome. Also considering gastroparesis. GYN consulted, endometriosis unlikely given lack of findings on TVUS -- no endometrial material noted outside endometrial canal. CNS associated disease unlikely given lack of other signs or symptoms. Given lack of risk factors, doubt cardiovascular cause of epigastric pain. Symptomatic management, c/w pantoprazole, Toradol, and antiemetics.  GI consulted, HIDA scan negative for biliary dyskinesia, diet resumed Patient has been on PO antibiotics for outpatient treatment of pyelonephritis. No signs of recurrent pyelonephritis or abscess on CT a/p with IV contrast. Will continue alternative to cefdinir for completion of regimen. Stop date added. Metabolic acidosis: RESOLVED. Secondary to lactic acidosis and starvation ketosis s/p IVF

## 2019-07-03 NOTE — CHART NOTE - NSCHARTNOTEFT_GEN_A_CORE
Notified by RN pt with Iv site pain requesting morphine.   Pt examine at a bedside, pt uncooperative. pt states she has been having LBP pain for 4 months now. Pt refuses to answer any questions, yelling at the provider stating that she has told " other doctors about her pain, go ask them". Pt refuses physical exam.   Explained at length that I am here to adress her pain but its hard for the provider to do so w Notified by RN pt with Iv site pain requesting morphine.   Pt examine at a bedside, pt uncooperative. pt states she has been having LBP pain for 4 months now. Pt refuses to answer any questions, yelling at the provider stating that she has told " other doctors about her pain, go ask them". Pt refuses physical exam.   Explained at length that I am here to address her pain but its hard for the provider to do so without proper exam and interview. Notified by RN pt with Iv site pain requesting morphine.   Pt examine at a bedside, pt uncooperative. pt states she has been having LBP pain for 4 months now. Pt refuses to answer any questions, yelling at the provider stating that she has told " other doctors about her pain, go ask them". Pt refuses physical exam.   Explained at length that I am here to address her pain but its hard for the provider to do so without proper exam and interview. pt still uncooperative, yelling and cruising at the provider.   - lidocaine patch ordered, tylenol PO ordered  Notified by RN pt on a hallway, yelling, cursing asking to see a MD, not a PA, Notified by RN pt with Iv site pain requesting morphine.   Pt examine at a bedside, pt uncooperative. pt states she has been having LBP pain for 4 months now. Pt refuses to answer any questions, yelling at the provider stating that she has told " other doctors about her pain, go ask them". Pt refuses physical exam.   Explained at length that I am here to address her pain but its hard for the provider to do so without proper exam and interview. pt still uncooperative, yelling and cruising at the provider.   - lidocaine patch ordered, tylenol PO ordered  Notified by RN pt on a hallway, yelling, cursing asking to see a MD, not a PA,  [facility] office called informed about the events

## 2019-07-03 NOTE — PROGRESS NOTE ADULT - ASSESSMENT
Aminata Leach is a 17 y/o woman w/ past hx significant for migraines who presents with 4 months of intermittent epigastric pain, nausea, and vomiting of unclear etiology w/o any significant findings on EGD, gastric emptying study, or imaging. Of note she has been hospitalized 5-6 times at numerous hospitals in the area over the past few months, most recently [facility] 2 weeks ago, where she was treated for pyelonephritis.     At this time, the patient's GI symptoms are of unclear etiology. She has had an extensive workup between everything that has been done at outside hospitals and during this admission, which has been non-revelatory. Leukocytosis and mildly elevated LFTs have now resolved, and TSH is wnl. It is unclear if she has an organic cause for her symptoms, as her blood tests have now normalized and she continues to be symptomatic, while the most common GI causes (cholecystitis, pancreatitis, gastroparesis) have been effectively ruled out. Other etiologies that could explain her symptoms include mastocytosis, lead toxicity, hereditary angioedema, or porphyria. At this time, would recommend completing imaging workup with HIDA scan and sending labs to evaluate for more rare etiologies.       Recommend:  -Obtain [facility] HIDA to rule out biliary dyskinesia  -Check urine porphobilinogen, serum lead, C1 esterase, C3, C4, tryptase, transglutaminase antibodies with IgA  -Obtain records from [place] and [facility] for review  -Avoid narcotics  -Continue to hold marijuana use Aminata Leach is a 17 y/o woman w/ past hx significant for migraines who presents with 4 months of intermittent epigastric pain, nausea, and vomiting of unclear etiology w/o any significant findings on EGD, gastric emptying study, or imaging. Of note she has been hospitalized 5-6 times at numerous hospitals in the area over the past few months, most recently [facility] 2 weeks ago, where she was treated for pyelonephritis.     At this time, the patient's GI symptoms are of unclear etiology. She has had an extensive workup between everything that has been done at outside hospitals and during this admission, which has been non-revelatory. Leukocytosis and mildly elevated LFTs have now resolved, and TSH is wnl. It is unclear if she has an organic cause for her symptoms, as her blood tests have now normalized and she continues to be symptomatic, while the most common GI causes (cholecystitis, pancreatitis, gastroparesis) have been effectively ruled out. Other etiologies that could explain her symptoms include mastocytosis, lead toxicity, hereditary angioedema, or porphyria. At this time, would recommend completing imaging workup with HIDA scan and sending labs to evaluate for more rare etiologies.       Recommend:  -Follow-up [facility] HIDA to rule out biliary dyskinesia  -Follow-up urine porphobilinogen, C1 esterase, transglutaminase antibodies with IgA  -lead, C3 and C4 are normal so far  -Obtain records from [facility] for review  -Avoid narcotics  -Continue to hold marijuana use

## 2019-07-03 NOTE — PROGRESS NOTE ADULT - ASSESSMENT
17 y/o F PMHx of migraines, mild intermittent asthma and Chlamydia infection (2 years ago) who presents with 4 months of intermittent epigastric pain.

## 2019-07-03 NOTE — PROGRESS NOTE ADULT - SUBJECTIVE AND OBJECTIVE BOX
Mulugeta Escobar   Internal Medicine PGY-1  [id number]      PATIENT:  AMINATA MARCIAL  [id number]      SUMMARY:  Aminata Mracial is a 17 y/o woman w/ past hx significant for migraines who presents with 4 months of intermittent epigastric pain, nausea, and vomiting of unclear etiology w/o any significant findings on EGD, gastric emptying study, or imaging, now pending HIDA-scan and further blood workup. Of note she has been hospitalized 5-6 times at numerous hospitals in the area over the past few months, most recently [facility] 2 weeks ago, where she was treated for pyelonephritis.       INTERVAL HISTORY/OVERNIGHT EVENTS:  -unable to get HIDA scan due to pain  -pain quickly relieved with morphine  -seen by GYN - do not think symptoms related to endometriosis or other gyn cause      SUBJECTIVE:  -no abdominal pain or nausea since yesterday morning after she received morphine  -Pt states that being npo and not eating in the morning causes her to have abdominal pain and nausea      OBJECTIVE:    T(C): 37.1 (07-03-19 @ 04:26), Max: 37.2 (07-02-19 @ 12:40)  HR: 81 (07-03-19 @ 04:26) (79 - 96)  BP: 102/65 (07-03-19 @ 04:26) (102/65 - 123/81)  RR: 18 (07-03-19 @ 04:26) (18 - 18)  SpO2: 100% (07-03-19 @ 04:26) (98% - 100%)      07-02-19 @ 07:01  -  07-03-19 @ 07:00  --------------------------------------------------------  IN: 540 mL / OUT: 0 mL / NET: 540 mL          PHYSICAL EXAMINATION:  GENERAL:  Appears stated age, well-groomed, well-nourished, no distress  HEENT:  NC/AT,  conjunctivae clear and pink  CHEST:  Full & symmetric excursion, no increased effort, breath sounds clear  HEART:  Regular rhythm, S1, S2, no murmur  ABDOMEN:  + mild epigastric tenderness to palpation, Soft, non-distended, normoactive bowel sounds,  no masses, no rebound/guarding  EXTREMITIES:  no cyanosis, clubbing or edema  SKIN:  No rash/erythema/ecchymoses/petechiae/wounds/abscess/warm/dry  NEURO:  Alert, oriented x 3  PSYCH: Normal affect    LABS:                          11.1   8.66  )-----------( 295      ( 02 Jul 2019 09:54 )             34.8     07-03    139  |  106  |  9   ----------------------------<  87  3.4<L>   |  22  |  0.75    Ca    8.7      03 Jul 2019 06:44  Phos  3.1     07-01  Mg     2.0     07-02    TPro  5.8<L>  /  Alb  3.4  /  TBili  0.4  /  DBili  x   /  AST  15  /  ALT  25  /  AlkPhos  48  07-02    LIVER FUNCTIONS - ( 02 Jul 2019 07:12 )  Alb: 3.4 g/dL / Pro: 5.8 g/dL / ALK PHOS: 48 U/L / ALT: 25 U/L / AST: 15 U/L / GGT: x               IMAGING:      MEDICATIONS:  MEDICATIONS  (STANDING):  cefpodoxime 200 milliGRAM(s) Oral two times a day  nicotine -   7 mG/24Hr(s) Patch 1 patch Transdermal daily  pantoprazole    Tablet 40 milliGRAM(s) Oral before breakfast  sodium chloride 0.9%. 1000 milliLiter(s) (75 mL/Hr) IV Continuous <Continuous>    MEDICATIONS  (PRN):  ALBUTerol/ipratropium for Nebulization 3 milliLiter(s) Nebulizer every 6 hours PRN Wheezing  ondansetron Injectable 4 milliGRAM(s) IV Push every 6 hours PRN Nausea and/or Vomiting Mulugeta Escobar   Internal Medicine PGY-1  [id number]      PATIENT:  AMINATA MARCIAL  [id number]      SUMMARY:  Aminata Marcial is a 17 y/o woman w/ past hx significant for migraines who presents with 4 months of intermittent epigastric pain, nausea, and vomiting of unclear etiology w/o any significant findings on EGD, gastric emptying study, or imaging, now pending HIDA-scan and further blood workup. Of note she has been hospitalized 5-6 times at numerous hospitals in the area over the past few months, most recently [facility] 2 weeks ago, where she was treated for pyelonephritis.       SUBJECTIVE:  -no abdominal pain or nausea since yesterday morning after she received morphine  -Pt states that being npo and not eating in the morning causes her to have abdominal pain and nausea      OBJECTIVE:    T(C): 37.1 (07-03-19 @ 04:26), Max: 37.2 (07-02-19 @ 12:40)  HR: 81 (07-03-19 @ 04:26) (79 - 96)  BP: 102/65 (07-03-19 @ 04:26) (102/65 - 123/81)  RR: 18 (07-03-19 @ 04:26) (18 - 18)  SpO2: 100% (07-03-19 @ 04:26) (98% - 100%)      07-02-19 @ 07:01  -  07-03-19 @ 07:00  --------------------------------------------------------  IN: 540 mL / OUT: 0 mL / NET: 540 mL          PHYSICAL EXAMINATION:  GENERAL:  Appears stated age, well-groomed, well-nourished, no distress  HEENT:  NC/AT,  conjunctivae clear and pink  CHEST:  Full & symmetric excursion, no increased effort, breath sounds clear  HEART:  Regular rhythm, S1, S2, no murmur  ABDOMEN:  + mild epigastric tenderness to palpation, Soft, non-distended, normoactive bowel sounds,  no masses, no rebound/guarding  EXTREMITIES:  no cyanosis, clubbing or edema  SKIN:  No rash/erythema/ecchymoses/petechiae/wounds/abscess/warm/dry  NEURO:  Alert, oriented x 3  PSYCH: Normal affect    LABS:                          11.1   8.66  )-----------( 295      ( 02 Jul 2019 09:54 )             34.8     07-03    139  |  106  |  9   ----------------------------<  87  3.4<L>   |  22  |  0.75    Ca    8.7      03 Jul 2019 06:44  Phos  3.1     07-01  Mg     2.0     07-02    TPro  5.8<L>  /  Alb  3.4  /  TBili  0.4  /  DBili  x   /  AST  15  /  ALT  25  /  AlkPhos  48  07-02    LIVER FUNCTIONS - ( 02 Jul 2019 07:12 )  Alb: 3.4 g/dL / Pro: 5.8 g/dL / ALK PHOS: 48 U/L / ALT: 25 U/L / AST: 15 U/L / GGT: x                 MEDICATIONS:  MEDICATIONS  (STANDING):  cefpodoxime 200 milliGRAM(s) Oral two times a day  nicotine -   7 mG/24Hr(s) Patch 1 patch Transdermal daily  pantoprazole    Tablet 40 milliGRAM(s) Oral before breakfast  sodium chloride 0.9%. 1000 milliLiter(s) (75 mL/Hr) IV Continuous <Continuous>    MEDICATIONS  (PRN):  ALBUTerol/ipratropium for Nebulization 3 milliLiter(s) Nebulizer every 6 hours PRN Wheezing  ondansetron Injectable 4 milliGRAM(s) IV Push every 6 hours PRN Nausea and/or Vomiting

## 2019-07-03 NOTE — DISCHARGE NOTE PROVIDER - NSDCCPCAREPLAN_GEN_ALL_CORE_FT
PRINCIPAL DISCHARGE DIAGNOSIS  Diagnosis: Epigastric pain  Assessment and Plan of Treatment: Tylenol for pain management  Abdominal cat scan and transvaginal ultrasound with no etiology of infection  GYN consulted, no GYN source for pain      SECONDARY DISCHARGE DIAGNOSES  Diagnosis: Nausea and vomiting  Assessment and Plan of Treatment: Continue antiemetics as prescribed   Follow up with gastrenterologist    Diagnosis: UTI (urinary tract infection)  Assessment and Plan of Treatment: Continue antibiotics as prescribed

## 2019-07-03 NOTE — PROVIDER CONTACT NOTE (OTHER) - ASSESSMENT
Pt A&Ox4, denies chest pain/SOB. L IV removed (catheter not patent), pt refusing IV access at this time.

## 2019-07-03 NOTE — DISCHARGE NOTE PROVIDER - NSFOLLOWUPCLINICS_GEN_ALL_ED_FT
[facility] Gastroenterology  Gastroenterology  [address] [place] [id number]  Phone: [phone]  Fax:   Follow Up Time: 7-10 Days

## 2019-07-03 NOTE — PROGRESS NOTE ADULT - PROBLEM SELECTOR PLAN 3
Leukocytosis likely reactive vs hemoconcentration related - now resolved. HR improved.   -No clear source of infection. Symptoms have resolved. ROS currently negative. CT a/p with IV contrast, TVUS, RUQ sonogram have no elucidated a source of infection.   -Patient with improvement in symptoms without antibiotics. Will continue home Cephalosporin alone for now.   -F/u Bcx and Ucx. Bcx negative to date. Ucx with enterococcus however not a significant # of CFUs. Patient without dysuria or frequency. No fevers during admission.

## 2019-07-04 VITALS
TEMPERATURE: 98 F | DIASTOLIC BLOOD PRESSURE: 74 MMHG | OXYGEN SATURATION: 99 % | RESPIRATION RATE: 18 BRPM | HEART RATE: 92 BPM | SYSTOLIC BLOOD PRESSURE: 114 MMHG

## 2019-07-04 LAB
ANION GAP SERPL CALC-SCNC: 14 MMOL/L — SIGNIFICANT CHANGE UP (ref 5–17)
BUN SERPL-MCNC: 9 MG/DL — SIGNIFICANT CHANGE UP (ref 7–23)
C1INH FUNCTIONAL/C1INH TOTAL MFR SERPL: 23 MG/DL — SIGNIFICANT CHANGE UP (ref 21–39)
CALCIUM SERPL-MCNC: 9.2 MG/DL — SIGNIFICANT CHANGE UP (ref 8.4–10.5)
CHLORIDE SERPL-SCNC: 102 MMOL/L — SIGNIFICANT CHANGE UP (ref 96–108)
CO2 SERPL-SCNC: 22 MMOL/L — SIGNIFICANT CHANGE UP (ref 22–31)
CREAT SERPL-MCNC: 0.66 MG/DL — SIGNIFICANT CHANGE UP (ref 0.5–1.3)
GLUCOSE SERPL-MCNC: 88 MG/DL — SIGNIFICANT CHANGE UP (ref 70–99)
POTASSIUM SERPL-MCNC: 3.5 MMOL/L — SIGNIFICANT CHANGE UP (ref 3.5–5.3)
POTASSIUM SERPL-SCNC: 3.5 MMOL/L — SIGNIFICANT CHANGE UP (ref 3.5–5.3)
SODIUM SERPL-SCNC: 138 MMOL/L — SIGNIFICANT CHANGE UP (ref 135–145)

## 2019-07-04 PROCEDURE — 99239 HOSP IP/OBS DSCHRG MGMT >30: CPT

## 2019-07-04 RX ADMIN — Medication 1 PATCH: at 11:54

## 2019-07-04 RX ADMIN — LIDOCAINE 1 PATCH: 4 CREAM TOPICAL at 09:33

## 2019-07-04 RX ADMIN — Medication 200 MILLIGRAM(S): at 05:22

## 2019-07-04 RX ADMIN — PANTOPRAZOLE SODIUM 40 MILLIGRAM(S): 20 TABLET, DELAYED RELEASE ORAL at 05:22

## 2019-07-04 RX ADMIN — LIDOCAINE 1 PATCH: 4 CREAM TOPICAL at 11:14

## 2019-07-04 NOTE — DISCHARGE NOTE NURSING/CASE MANAGEMENT/SOCIAL WORK - NSDCDPATPORTLINK_GEN_ALL_CORE
You can access the [facility] Patient Portal, offered by [facility], by registering with the following website: [email or web address]

## 2019-07-04 NOTE — PROGRESS NOTE ADULT - PROBLEM SELECTOR PLAN 3
Leukocytosis likely reactive vs hemoconcentration related - now resolved. HR improved.   -No clear source of infection. Symptoms have resolved. ROS currently negative. CT a/p with IV contrast, TVUS, RUQ sonogram have no elucidated a source of infection.   -Patient with improvement in symptoms without antibiotics. continuing Vantin.   -F/u Bcx and Ucx. Bcx negative to date. Ucx with enterococcus however not a significant # of CFUs. Patient without dysuria or frequency. No fevers during admission.

## 2019-07-04 NOTE — PROGRESS NOTE ADULT - PROBLEM SELECTOR PLAN 1
With associated epigastric pain. Unclear etiology at this point in time. CT a/p with IV contrast, TVUS, RUQ sonogram have all been negative. Endoscopy within 1 week of admission per patient "normal," however PUD and H. Pylori a possibility. Lipase within normal limits. Patient has not been smoking marijuana (utox positive but patient prior heavy smoker). She does have a history of migraines which holds a associated with cyclic vomiting syndrome. Question function abdominal pain as etiology of pain if all testing negative  -Symptomatic management.  -Bcx NGTD.  Ucx with lisa corado.  -C/w pantoprazole.   -Hga1c WNL.   -GI consult -- recommendations appreciated. Final workup at the labs. Outpatient follow up   -HIDA scan negative for biliary dsykinesia.   -Gyn input appreciated -- unlikely the source.

## 2019-07-04 NOTE — PROGRESS NOTE ADULT - PROBLEM SELECTOR PLAN 2
Patient has been on PO antibiotics for outpatient treatment of pyelonephritis. No signs of recurrent pyelonephritis or abscess on CT a/p with IV contrast.   - On Vantin to be completed on July 8th.

## 2019-07-04 NOTE — PROGRESS NOTE ADULT - SUBJECTIVE AND OBJECTIVE BOX
Daniel Carr MD  Division of Hospital Medicine  Pager [phone]  If no response or off hours page: [phone]  ---------------------------------------------------------    DM MARCIAL  18y  Female      Patient is a 18y old  Female who presents with a chief complaint of Epigastric pain.  Nausea  Vomiting  Intolerance of PO (03 Jul 2019 17:32)      INTERVAL HPI/OVERNIGHT EVENTS:  Seen at bedside, no pain this morning or after lunch. Tolerating diet.       REVIEW OF SYSTEMS: 14 point ROS negative unless listed above    T(C): 36.8 (07-04-19 @ 11:49), Max: 37 (07-03-19 @ 20:44)  HR: 92 (07-04-19 @ 11:49) (80 - 92)  BP: 114/74 (07-04-19 @ 11:49) (102/64 - 114/74)  RR: 18 (07-04-19 @ 11:49) (16 - 18)  SpO2: 99% (07-04-19 @ 11:49) (96% - 100%)  Wt(kg): --Vital Signs Last 24 Hrs  T(C): 36.8 (04 Jul 2019 11:49), Max: 37 (03 Jul 2019 20:44)  T(F): 98.2 (04 Jul 2019 11:49), Max: 98.6 (03 Jul 2019 20:44)  HR: 92 (04 Jul 2019 11:49) (80 - 92)  BP: 114/74 (04 Jul 2019 11:49) (102/64 - 114/74)  BP(mean): --  RR: 18 (04 Jul 2019 11:49) (16 - 18)  SpO2: 99% (04 Jul 2019 11:49) (96% - 100%)    PHYSICAL EXAM:  GENERAL: NAD, well-groomed, well-developed  NECK: Supple, No JVD  CHEST/LUNG: Clear to auscultation bilaterally; No rales, rhonchi, wheezing, or rubs  HEART: Regular rate and rhythm; No murmurs, rubs, or gallops  ABDOMEN: Soft, Nontender, Nondistended; Bowel sounds present.    EXTREMITIES:  2+ Peripheral Pulses, No clubbing, cyanosis, or edema  PSYCH: Alert & Oriented x3  NERVOUS SYSTEM: Motor Strength 5/5 B/L upper and lower extremities.  Sensory intact    Consultant(s) Notes Reviewed:  [x ] YES  [ ] NO  Care Discussed with Consultants/Other Providers [ x] YES  [ ] NO    LABS:                        11.1   8.76  )-----------( 302      ( 03 Jul 2019 11:16 )             34.8     07-04    138  |  102  |  9   ----------------------------<  88  3.5   |  22  |  0.66    Ca    9.2      04 Jul 2019 06:37          CAPILLARY BLOOD GLUCOSE                RADIOLOGY & ADDITIONAL TESTS:    Imaging Personally Reviewed:  [ ] YES  [ ] NO

## 2019-07-04 NOTE — PROGRESS NOTE ADULT - REASON FOR ADMISSION
Epigastric pain.  Nausea  Vomiting  Intolerance of PO

## 2019-07-04 NOTE — PROGRESS NOTE ADULT - ATTENDING COMMENTS
Medically stable for discharge home today with outpatient GI follow up for lab results and continued work up     d/c time spent 32 minutes

## 2019-07-05 LAB
C1INH FUNCTIONAL FLD-MCNC: >90 — SIGNIFICANT CHANGE UP
TRYPTASE SERPL-MCNC: 2.9 NG/ML — SIGNIFICANT CHANGE UP
TTG IGA SER-ACNC: <1.2 U/ML — SIGNIFICANT CHANGE UP
TTG IGA SER-ACNC: NEGATIVE — SIGNIFICANT CHANGE UP
TTG IGG SER IA-ACNC: NEGATIVE — SIGNIFICANT CHANGE UP
TTG IGG SER-ACNC: 1.4 U/ML — SIGNIFICANT CHANGE UP

## 2019-07-06 LAB
CULTURE RESULTS: SIGNIFICANT CHANGE UP
CULTURE RESULTS: SIGNIFICANT CHANGE UP
SPECIMEN SOURCE: SIGNIFICANT CHANGE UP
SPECIMEN SOURCE: SIGNIFICANT CHANGE UP

## 2019-07-08 LAB
PBG UR-MCNC: 0 MG/24 H — SIGNIFICANT CHANGE UP
PORPHOBILINOGEN QUANT UR INTERP: SIGNIFICANT CHANGE UP
PORPHYRINS UR-MCNC: 1325 ML — SIGNIFICANT CHANGE UP

## 2019-07-10 PROCEDURE — 87389 HIV-1 AG W/HIV-1&-2 AB AG IA: CPT

## 2019-07-10 PROCEDURE — 76705 ECHO EXAM OF ABDOMEN: CPT

## 2019-07-10 PROCEDURE — 85014 HEMATOCRIT: CPT

## 2019-07-10 PROCEDURE — 86850 RBC ANTIBODY SCREEN: CPT

## 2019-07-10 PROCEDURE — 74177 CT ABD & PELVIS W/CONTRAST: CPT

## 2019-07-10 PROCEDURE — 82947 ASSAY GLUCOSE BLOOD QUANT: CPT

## 2019-07-10 PROCEDURE — 82550 ASSAY OF CK (CPK): CPT

## 2019-07-10 PROCEDURE — 85610 PROTHROMBIN TIME: CPT

## 2019-07-10 PROCEDURE — 85027 COMPLETE CBC AUTOMATED: CPT

## 2019-07-10 PROCEDURE — 96374 THER/PROPH/DIAG INJ IV PUSH: CPT | Mod: XU

## 2019-07-10 PROCEDURE — 82435 ASSAY OF BLOOD CHLORIDE: CPT

## 2019-07-10 PROCEDURE — 80307 DRUG TEST PRSMV CHEM ANLYZR: CPT

## 2019-07-10 PROCEDURE — 82553 CREATINE MB FRACTION: CPT

## 2019-07-10 PROCEDURE — 83605 ASSAY OF LACTIC ACID: CPT

## 2019-07-10 PROCEDURE — 82330 ASSAY OF CALCIUM: CPT

## 2019-07-10 PROCEDURE — 78227 HEPATOBIL SYST IMAGE W/DRUG: CPT

## 2019-07-10 PROCEDURE — 82803 BLOOD GASES ANY COMBINATION: CPT

## 2019-07-10 PROCEDURE — 86901 BLOOD TYPING SEROLOGIC RH(D): CPT

## 2019-07-10 PROCEDURE — 82565 ASSAY OF CREATININE: CPT

## 2019-07-10 PROCEDURE — 86160 COMPLEMENT ANTIGEN: CPT

## 2019-07-10 PROCEDURE — 85730 THROMBOPLASTIN TIME PARTIAL: CPT

## 2019-07-10 PROCEDURE — 87040 BLOOD CULTURE FOR BACTERIA: CPT

## 2019-07-10 PROCEDURE — 84110 ASSAY OF PORPHOBILINOGEN: CPT

## 2019-07-10 PROCEDURE — 80048 BASIC METABOLIC PNL TOTAL CA: CPT

## 2019-07-10 PROCEDURE — 84443 ASSAY THYROID STIM HORMONE: CPT

## 2019-07-10 PROCEDURE — 86161 COMPLEMENT/FUNCTION ACTIVITY: CPT

## 2019-07-10 PROCEDURE — 83520 IMMUNOASSAY QUANT NOS NONAB: CPT

## 2019-07-10 PROCEDURE — A9537: CPT

## 2019-07-10 PROCEDURE — 81025 URINE PREGNANCY TEST: CPT

## 2019-07-10 PROCEDURE — 99285 EMERGENCY DEPT VISIT HI MDM: CPT | Mod: 25

## 2019-07-10 PROCEDURE — 93975 VASCULAR STUDY: CPT

## 2019-07-10 PROCEDURE — 96375 TX/PRO/DX INJ NEW DRUG ADDON: CPT

## 2019-07-10 PROCEDURE — 76830 TRANSVAGINAL US NON-OB: CPT

## 2019-07-10 PROCEDURE — 83690 ASSAY OF LIPASE: CPT

## 2019-07-10 PROCEDURE — 83655 ASSAY OF LEAD: CPT

## 2019-07-10 PROCEDURE — 81001 URINALYSIS AUTO W/SCOPE: CPT

## 2019-07-10 PROCEDURE — 80053 COMPREHEN METABOLIC PANEL: CPT

## 2019-07-10 PROCEDURE — 84100 ASSAY OF PHOSPHORUS: CPT

## 2019-07-10 PROCEDURE — 86364 TISS TRNSGLTMNASE EA IG CLAS: CPT

## 2019-07-10 PROCEDURE — 83036 HEMOGLOBIN GLYCOSYLATED A1C: CPT

## 2019-07-10 PROCEDURE — 84132 ASSAY OF SERUM POTASSIUM: CPT

## 2019-07-10 PROCEDURE — 84295 ASSAY OF SERUM SODIUM: CPT

## 2019-07-10 PROCEDURE — 86900 BLOOD TYPING SEROLOGIC ABO: CPT

## 2019-07-10 PROCEDURE — 93005 ELECTROCARDIOGRAM TRACING: CPT

## 2019-07-10 PROCEDURE — 83735 ASSAY OF MAGNESIUM: CPT

## 2019-07-10 PROCEDURE — 87086 URINE CULTURE/COLONY COUNT: CPT

## 2019-07-10 PROCEDURE — 86703 HIV-1/HIV-2 1 RESULT ANTBDY: CPT

## 2019-07-10 PROCEDURE — 87186 SC STD MICRODIL/AGAR DIL: CPT

## 2019-07-10 PROCEDURE — 76856 US EXAM PELVIC COMPLETE: CPT

## 2019-12-03 ENCOUNTER — EMERGENCY (EMERGENCY)
Facility: HOSPITAL | Age: 19
LOS: 1 days | Discharge: LEFT BEFORE DISCHARGE | End: 2019-12-03
Attending: EMERGENCY MEDICINE
Payer: MEDICAID

## 2019-12-03 VITALS
DIASTOLIC BLOOD PRESSURE: 89 MMHG | HEART RATE: 76 BPM | OXYGEN SATURATION: 99 % | TEMPERATURE: 98 F | SYSTOLIC BLOOD PRESSURE: 124 MMHG | RESPIRATION RATE: 18 BRPM

## 2019-12-03 VITALS
OXYGEN SATURATION: 99 % | WEIGHT: 149.91 LBS | SYSTOLIC BLOOD PRESSURE: 128 MMHG | TEMPERATURE: 99 F | HEIGHT: 63 IN | HEART RATE: 74 BPM | DIASTOLIC BLOOD PRESSURE: 86 MMHG | RESPIRATION RATE: 18 BRPM

## 2019-12-03 PROBLEM — A74.9 CHLAMYDIAL INFECTION, UNSPECIFIED: Chronic | Status: ACTIVE | Noted: 2019-07-01

## 2019-12-03 PROBLEM — J45.909 UNSPECIFIED ASTHMA, UNCOMPLICATED: Chronic | Status: ACTIVE | Noted: 2019-07-01

## 2019-12-03 PROCEDURE — 99282 EMERGENCY DEPT VISIT SF MDM: CPT

## 2019-12-03 PROCEDURE — 99283 EMERGENCY DEPT VISIT LOW MDM: CPT

## 2019-12-03 NOTE — ED ADULT NURSE NOTE - NSIMPLEMENTINTERV_GEN_ALL_ED
Implemented All Universal Safety Interventions:  [place] to call system. Call bell, personal items and telephone within reach. Instruct patient to call for assistance. Room bathroom lighting operational. Non-slip footwear when patient is off stretcher. Physically safe environment: no spills, clutter or unnecessary equipment. Stretcher in lowest position, wheels locked, appropriate side rails in place.

## 2019-12-03 NOTE — ED PROVIDER NOTE - ATTENDING CONTRIBUTION TO CARE
Attending MD Reeves: I personally have seen and examined this patient.  Resident note reviewed and agree on plan of care and except where noted.  See below for details.     Seen in [facility], accompanied by boyfriend  PMD "I'm not sure of his name, but I believe he is a [facility] doctor"    18F with PMH/PSH including migraines, mild intermittent asthma (as per EMR 7/1/19), chlamydia infection (2017 as per EMR) presents to the ED with abdominal pain.  Reports since February "every time I catch my menstrual periods, I get sick".  Reports has abdominal pain in different areas.  Review of EMR reveals patient was seen in July, at that time evaluated by GI, when asked about follow up "reports I haven't had time.  Reports LMP started 5 days ago start, and has already finished, denies any pads/tampons today, "just got off of my period".  Denies dysuria, hematuria, reports increased urinary frequency, denies urgency.  Reports asthma has been well controlled, reports uses PRN Albuterol, last used last month.  Denies chest pain, shortness of breath, palpitations.  Reports 6 emetic episodes today, reports "it looked like bile, yellow and it tastes disgusting".  Reports able to have a sandwich yesterday evening.  Boyfriend reports he had same sandwich and is feeling well.  Reports has been drinking water today.  Reports "a little bit of diarrhea" but when asked when last BM was reports two days ago.   Denies fevers.  Reports smokes a cigarette a day every other day.  Denies EtOH.  Reports uses THC daily, reports smoked last night at around 8pm.  On exam,     TO BE COMPLETED Attending MD Reeves: I personally have seen and examined this patient.  Resident note reviewed and agree on plan of care and except where noted.  See below for details.     Seen in [facility], accompanied by boyfriend  PMD "I'm not sure of his name, but I believe he is a [place] doctor"    18F with PMH/PSH including migraines, mild intermittent asthma (as per EMR 7/1/19), chlamydia infection (2017 as per EMR) presents to the ED with abdominal pain.  Reports since February "every time I catch my menstrual periods, I get sick".  Reports has abdominal pain in different areas.  Review of EMR reveals patient was seen in July, at that time evaluated by GI, when asked about follow up "reports I haven't had time.  Reports LMP started 5 days ago start, and has already finished, denies any pads/tampons today, "just got off of my period".  Denies dysuria, hematuria, reports increased urinary frequency, denies urgency.  Reports asthma has been well controlled, reports uses PRN Albuterol, last used last month.  Denies chest pain, shortness of breath, palpitations.  Reports 6 emetic episodes today, reports "it looked like bile, yellow and it tastes disgusting".  Reports able to have a sandwich yesterday evening.  Boyfriend reports he had same sandwich and is feeling well.  Reports has been drinking water today.  Reports "a little bit of diarrhea" but when asked when last BM was reports two days ago.   Denies fevers.  Reports smokes a cigarette a day every other day.  Denies EtOH.  Reports uses THC daily, reports smoked last night at around 8pm.  On exam, patient laying R lateral decubitus, head covered with her robe,         When explained to patient, that given her clinical presentation, I would recommend a CT scan.  Explained that initially she did not want this MD or the resident to perform Attending MD Reeves: I personally have seen and examined this patient.  Resident note reviewed and agree on plan of care and except where noted.  See below for details.     Seen in [facility], accompanied by boyfriend  PMD "I'm not sure of his name, but I believe he is a [place] doctor"    18F with PMH/PSH including migraines, mild intermittent asthma (as per EMR 7/1/19), chlamydia infection (2017 as per EMR) presents to the ED with abdominal pain.  Reports since February "every time I catch my menstrual periods, I get sick".  Reports has abdominal pain in different areas.  Review of EMR reveals patient was seen in July, at that time evaluated by GI, when asked about follow up "reports I haven't had time.  Reports LMP started 5 days ago start, and has already finished, denies any pads/tampons today, "just got off of my period".  Denies dysuria, hematuria, reports increased urinary frequency, denies urgency.  Reports asthma has been well controlled, reports uses PRN Albuterol, last used last month.  Denies chest pain, shortness of breath, palpitations.  Reports 6 emetic episodes today, reports "it looked like bile, yellow and it tastes disgusting".  Reports able to have a sandwich yesterday evening.  Boyfriend reports he had same sandwich and is feeling well.  Reports has been drinking water today.  Reports "a little bit of diarrhea" but when asked when last BM was reports two days ago.   Denies fevers.  Reports smokes a cigarette a day every other day.  Denies EtOH.  Reports uses THC daily, reports smoked last night at around 8pm.  On exam, patient laying R lateral decubitus, head covered with her robe, responding with barely audible responses, moving slowly when asked to reposition, head NCAT, PERRL, FROM at neck, no tenderness to midline palpation, no stepoffs along length of spine, asked patient to change into gown, lungs CTAB with good inspiratory effort, +S1S2, no m/r/g, abdomen soft with +BS, +diffuse abdominal pain even with minimal palpation, ND, no CVAT, moving all extremities with 5/5 strength bilateral upper and lower extremities, good and equal  strength bilaterally; A/P: 18F with diffuse abdominal pain, review of EMR revealed patient was seen by GI in July 2019, GI note at that time documented patient had (?[facility]) EGD and gastric emptying study which was normal, here had CT with corpus luteum cyst but no other acute pathology and HIDA which was wnl.  Patient reports "itching" with Zofran.  Explained to patient that given degree of abdominal pain that she is demonstrating, would recommend CT AP at this time.  At that time, patient reports had CT scan in [place] 3 days ago which she reports normal.  Explained that despite normal CT scan at that time her abdominal exam is such that she may have had a change over the last three days causing her abdominal pain.  Explained that we would typically start with Tylenol and Reglan for pain and nausea.  When explained to patient, that given her clinical presentation, I would recommend a CT scan.  Explained that initially she did not want this MD or the resident to perform an abdominal exam and that if she is stating she needs something "stronger" for pain, then I would recommend repeat CT scan despite recent one.  Patient then sat up in bed, moved closer to the foot/edge of the bed and began yelling.  Directed to her boyfriend "My nigger what time is it, I don't have time for this, I need to go to a different hospital.  Tylenol doesn't do shit for me."  Patient then told this MD that she was going to leave. Explained that we want to help her and treat her pain and we could do labwork.  Patient continued to yell and reported she was going to leave.  Explained that we were happy to attempt to control her pain and try to investigate the cause but could not promise this would include narcotics.  Discussed possible etiologies including colitis, ovarian cyst, cyclic vomiting.  Patient reports she is leaving the hospital.  Declined to wait for paperwork.  Patient exhibiting drug seeking behavior, may have abdominal pathology but review of EMR and reports from boyfriend and patient is that extensive work up has not yielded any acute pathology.  Patient's behavior and physical presentation changed dramatically when told pain control would begin with Tylenol.  Patient walked out.

## 2019-12-03 NOTE — ED ADULT NURSE NOTE - OBJECTIVE STATEMENT
pt has been vomiting and has been seen in another ER for same.  she states "when I catch my period I get sick with vomiting"/  pt also states "I smoke pot every 4 to 5 hours a day".  she reportedly has had much testing including scans that were all negative.  vomiting occurs once a month  the day prior to her period and lasts until after her period finishes  pt states "I get morphine for the vomiting"

## 2019-12-03 NOTE — ED ADULT NURSE NOTE - CHPI ED NUR SYMPTOMS NEG
no fever/no diarrhea/no chills/no abdominal distension/no blood in stool/no burning urination/no hematuria/no dysuria

## 2020-01-01 ENCOUNTER — OUTPATIENT (OUTPATIENT)
Dept: OUTPATIENT SERVICES | Facility: HOSPITAL | Age: 20
LOS: 1 days | End: 2020-01-01
Payer: MEDICAID

## 2020-01-01 PROCEDURE — G9001: CPT

## 2020-01-08 DIAGNOSIS — Z71.89 OTHER SPECIFIED COUNSELING: ICD-10-CM

## 2020-05-04 NOTE — H&P ADULT - PROBLEM SELECTOR PLAN 4
Established Patient - Audio Only Telehealth Visit     The patient location is:  Home  The chief complaint leading to consultation is:  Medical follow-up  Visit type: Virtual visit with audio only (telephone)  Total time spent with patient: 15 min       The reason for the audio only service rather than synchronous audio and video virtual visit was related to technical difficulties or patient preference/necessity.     Each patient to whom I provide medical services by telemedicine is:  (1) informed of the relationship between the physician and patient and the respective role of any other health care provider with respect to management of the patient; and (2) notified that they may decline to receive medical services by telemedicine and may withdraw from such care at any time. Patient verbally consented to receive this service via voice-only telephone call.    (Portions of this note were dictated using voice recognition software and may contain dictation related errors in spelling/grammar/syntax not found on text review)    CC:  Three-month medical follow-up    HPI: 60 y.o. male audio only visit     Last visit February 2020 for hospital follow-up for SBO treated conservatively with NGT    Diabetes:  On Levemir 70 units daily, NovoLog 20 units with meals.  Trulicity currently at 1.5 mg weekly.  A1c was improving to 7.4 from 9 range with addition of Trulicity but most recent check in January shows elevation as below to 8.7.  At last visit admitted that he was drinking about 5 monster energy drinks every day and feels that this probably was elevating his blood sugars.  Since hospitalization for SBO he had stopped this practice.  However on further discussion today he states that he is back up to drinking 2 monster energy drinks every day, along with drinking regular soft during.  He also admits to eating sweets.  Had labs from his hepatologist this morning blood sugar was 419.  He states that when he checks his sugar in  the morning is usually in the 120 range, sometimes in the evening it gets up to to the 220 range.  Admits sometimes over eating.  Exercises by walking.  Does not skip meals     Eye exam:  Optometry visit 10/29/2019  Does get neuropathy symptoms that we discussed likely is on account of his diabetes     Hypertension on lisinopril 40 mg daily, metoprolol 200 mg daily, nifedipine 60 mg daily.  States his blood pressure is usually pretty normal when he checks at home     Also on Crestor 5 mg daily    Hypothyroidism:  Supposed to be on Synthroid 88 mcg a day but on today's call if states that he has not been taking this recently.     Anxiety on Zoloft 100 mg daily     Amitriptyline 50 mg at bedtime to help with sleep     Liver Transplant secondary to end-stage liver disease hepatitis C and prior alcohol abuse.:  Followed by hepatology.  On Prograf.   fibroscan showed stage II fibrosis     Chronic pain, followed by pain management.  On oxycodone, gabapentin.   Reviewed last pain management note on 01/28/2020.  Consideration of SI joint injection as needed (12/02/2019 performed), ALCIDES although patient wanted to defer at the moment.  Advised on continuing current meds and physical exercise (tries to walk with a cane).  No changes to therapy recommended.  UDS on file through pain management     CKD stage 3:  Renal function has been stable as below     ED, has been treated with sildenafil    Past Medical History:   Diagnosis Date    Anemia     Anxiety     Chronic pain syndrome 7/13/2011    CKD (chronic kidney disease), stage III     Diabetes mellitus type II, uncontrolled     Discitis of lumbosacral region 1/16/2015    ED (erectile dysfunction)     Encounter for blood transfusion     Genital herpes     Gout, arthritis     History of alcohol abuse     History of hepatitis C, s/p successful Rx w/ SVR24 (cure) - 5/2018 8/23/2011    Completed 24wks Epclusa + RBV w/SVR12 - 2/2018  -     History of positive PPD,  treatment status unknown     Pulmonary granulomas, negative sputum cultures for AFB and indeterminate quantferon test    History of substance abuse     Hypertension     Hypothyroidism     Liver replaced by transplant 8/23/2011    DATE: 12/16/2013  LIVER BIOPSY : REASON:  hep C staging  PATHOLOGY COMMITTEE NOTE/PLAN: :grade  1 / stage 1        Pancreatitis 2016    Peptic ulcer disease        Past Surgical History:   Procedure Laterality Date    CHOLECYSTECTOMY      INJECTION OF JOINT Right 12/2/2019    Procedure: INJECTION, JOINT SI;  Surgeon: Thu Penn MD;  [facility]: [facility];  Service: Pain Management;  Laterality: Right;  RT SI JNT INJ    LIVER TRANSPLANT  06/2010    SPINE SURGERY         Family History   Problem Relation Age of Onset    Cancer Mother     Diabetes Mother     Heart disease Mother     Diabetes Sister     Cancer Maternal Uncle 82        colon CA    Melanoma Neg Hx     Psoriasis Neg Hx     Lupus Neg Hx     Eczema Neg Hx     Acne Neg Hx        Social History     Tobacco Use    Smoking status: Former Smoker    Smokeless tobacco: Never Used   Substance Use Topics    Alcohol use: No     Comment: over 5 years ago, none currently    Drug use: No       Lab Results   Component Value Date    WBC 8.09 05/04/2020    HGB 14.2 05/04/2020    HCT 44.4 05/04/2020    MCV 88 05/04/2020     05/04/2020    CHOL 163 10/04/2019    TRIG 403 (H) 10/04/2019    HDL 30 (L) 10/04/2019    ALT 24 05/04/2020    AST 16 05/04/2020    BILITOT 0.3 05/04/2020    ALKPHOS 152 (H) 05/04/2020     (L) 05/04/2020    K 3.8 05/04/2020    CL 98 05/04/2020    CREATININE 1.1 05/04/2020    ESTGFRAFRICA >60.0 05/04/2020    EGFRNONAA >60.0 05/04/2020    CALCIUM 9.2 05/04/2020    ALBUMIN 3.5 05/04/2020    BUN 12 05/04/2020    CO2 24 05/04/2020    TSH 1.924 07/25/2018    PSA 0.30 09/06/2016    PSADIAG 0.28 10/09/2017    INR 1.0 07/10/2019    HGBA1C 8.7 (H) 01/23/2020    MICALBCREAT 186.1 (H) 03/27/2017     LDLCALC Invalid, Trig>400.0 10/04/2019     (H) 05/04/2020                 ROS:  GENERAL: No fever, chills, fatigability or weight loss.  SKIN: No rashes, no itching.  HEAD: No headaches.  EYES: No visual changes  EARS: No ear pain or changes in hearing.  NOSE: No congestion or rhinorrhea.  MOUTH & THROAT: No hoarseness, change in voice, or sore throat.  NODES: Denies swollen glands.  CHEST: Denies NARAYANAN, cyanosis, wheezing, cough and sputum production.  CARDIOVASCULAR: Denies chest pain, PND, orthopnea.  ABDOMEN: No nausea, vomiting, or changes in bowel function.  URINARY: No flank pain, dysuria or hematuria.  PERIPHERAL VASCULAR: No claudication or cyanosis.  MUSCULOSKELETAL: No acute symptoms  NEUROLOGIC:  Acute symptoms         IMPRESSION  1. Uncontrolled type 2 diabetes mellitus with hyperglycemia    2. CKD (chronic kidney disease), stage III    3. Hypertension, unspecified type    4. Hypertriglyceridemia    5. Liver transplant recipient    6. Immunosuppression    7. Hypothyroidism, unspecified type    8. Chronic pain syndrome    9. History of hepatitis C, s/p successful Rx w/ SVR24 (cure) - 5/2018    10. PAD (peripheral artery disease)    11. Severe obesity (BMI 35.0-35.9 with comorbidity)    12. Screening for malignant neoplasm of prostate            PLAN  DM: labs below. Counseling on stopping the energy drinks and sodas. Continue current tx with levemir 70, novolog 20 TID, trulicity 1.5 mg weekly    HTN: controlled per pt. States need metoprolol rx, refilled    Hypothyroid; not taking synthroid. Need to recheck labs        Orders Placed This Encounter   Procedures    Comprehensive metabolic panel    Hemoglobin A1C    Lipid Panel    TSH    PSA, Screening                         SCREENINGS   Colonoscopy 2015, return in 10 years   prostate testing 2016/8      Immunizations   Pneumovax 2019  PCV 10/29/2018   Flu: utd   Hepatitis B series up-to-date   Patient states he had tetanus shot around  2015               This service was not originating from a related E/M service provided within the previous 7 days nor will  to an E/M service or procedure within the next 24 hours or my soonest available appointment.  Prevailing standard of care was able to be met in this audio-only visit.       Secondary to lactic acidosis and starvation ketosis   -S/p 3L LR in ED. C/w NS at 150 cc/hr.   -Trend lactate to clearance. For HR and Leukocytosis.  -No clear source of infection. Symptoms have resolved. ROS currently negative. CT a/p with IV contrast, TVUS, RUQ sonogram have no elucidated a source of infection.   -Patient with improvement in symptoms without antibiotics. Will monitor off abx.  -Will obtain Bcx and Ucx.

## 2021-01-17 NOTE — PROGRESS NOTE ADULT - SUBJECTIVE AND OBJECTIVE BOX
INTERNAL MEDICINE FOLLOW UP    Dr. Bin Vazquez, DO  [phone]    Patient seen and examined this AM.   HIDA accomplished.   This AM, patient denied any chest pain, shortness of breath, nausea, vomiting, diarrhea, fever, chills.   Reexamined after HIDA scan around 2pm after which the patient ate a chicken sandwich and with report of RUQ pain.    REVIEW OF SYSTEMS:  CONSTITUTIONAL: No fevers or chills  	EYES/ENT: No visual changes. No discharge from eyes. No vertigo. No throat pain. No dysphagia.  	NECK: No pain or stiffness or rigidity.  	RESPIRATORY: No cough, wheezing, or hemoptysis. No shortness of breath.  	CARDIOVASCULAR: No chest pain or palpitations.   	GASTROINTESTINAL: +abdominal pain. +nausea. No vomiting. No hematemesis; No diarrhea or constipation. No melena or hematochezia.  	GENITOURINARY: No dysuria, hesitancy, frequency or hematuria.  	NEUROLOGICAL: No numbness or weakness. No change in speech.  MUSCULOSKELETAL No joint swelling or erythema. No back pain.  	SKIN: No itching or rashes.   	PSYCH: Normal affect. Normal mood.    Review of systems negative except for items noted above.    PAST MEDICAL & SURGICAL HISTORY:  Chlamydia  Asthma  No significant past surgical history    MEDICATIONS  (STANDING):  cefpodoxime 200 milliGRAM(s) Oral two times a day  ketorolac   Injectable 15 milliGRAM(s) IV Push once  nicotine -   7 mG/24Hr(s) Patch 1 patch Transdermal daily  pantoprazole    Tablet 40 milliGRAM(s) Oral before breakfast  sodium chloride 0.9%. 1000 milliLiter(s) (75 mL/Hr) IV Continuous <Continuous>    MEDICATIONS  (PRN):  ALBUTerol/ipratropium for Nebulization 3 milliLiter(s) Nebulizer every 6 hours PRN Wheezing  ondansetron Injectable 4 milliGRAM(s) IV Push every 6 hours PRN Nausea and/or Vomiting    Allergies  tramadol (Drowsiness)    OBJECTIVE  ICU Vital Signs Last 24 Hrs  T(C): 37.1 (2019 04:26), Max: 37.1 (2019 22:12)  T(F): 98.7 (2019 04:26), Max: 98.8 (2019 22:12)  HR: 81 (2019 04:26) (81 - 96)  BP: 102/65 (2019 04:26) (102/65 - 109/67)  RR: 18 (2019 04:26) (18 - 18)  SpO2: 100% (2019 04:26) (98% - 100%)    	CONSTITUTIONAL: Appears uncomfortable with nausea.  	HEENT:  Head atraumatic. Conjunctiva clear B/L. Nasal mucosa normal. Moist oral mucosa. No posterior pharyngeal lesions noted.  	Cardiovascular: RRR with no murmurs. No JVD noted. No lower extremitiy edema B/L.  	Respiratory: Lungs CTAB. No accessory muscle use.   	Gastrointestinal:  Soft; Tenderness noted in the epigastric region and RUQ region on deep palpation. Non-distended. Non-rigid. Mild pain with carlos's punch now to the right CVA; none on the left.  	MSK:  No joint swelling. No joint erythema B/L. No midline spinal tenderness.  	Vascular: Radial pulses 2+ B/L. Dorsalis pedis pulses 2+ B/L.  	Neurologic:  Alert and awake. Oriented x3. Moving all extremities. Following commands. Making eye contact. No focal deficits.  	Skin:  No rashes noted. No skin erythema noted. Extremities warm and well perfused throughout.  Psych:  Normal affect. Normal Mood.    LABS                        11.1   8.66  )-----------( 295      ( 2019 09:54 )             34.8     07-02    141  |  109<H>  |  9   ----------------------------<  90  4.1   |  21<L>  |  0.66    Ca    8.7      2019 07:12  Phos  3.1     07-01  Mg     2.0     07-02    TPro  5.8<L>  /  Alb  3.4  /  TBili  0.4  /  DBili  x   /  AST  15  /  ALT  25  /  AlkPhos  48  07-02    PT/INR - ( 2019 22:32 )   PT: 14.5 sec;   INR: 1.26 ratio        PTT - ( 2019 22:32 )  PTT:28.9 sec  Urinalysis Basic - ( 2019 00:21 )    Color: Yellow / Appearance: Slightly Turbid / S.033 / pH: x  Gluc: x / Ketone: Large  / Bili: Negative / Urobili: Negative   Blood: x / Protein: 30 mg/dL / Nitrite: Negative   Leuk Esterase: Large / RBC: 8 /hpf / WBC 95 /HPF   Sq Epi: x / Non Sq Epi: 4 /hpf / Bacteria: Negative    PERSONALLY REVIEWED RADIOLOGIST'S IMPRESSION:  -CT a/p with IV contrast  -TVUS with doppler  -RUQ sonogram.  -HIDA scan Abnormal WBC: < 4,000 OR > 12,000

## 2024-11-14 NOTE — ED ADULT NURSE NOTE - NS ED NOTE ABUSE RESPONSE YN
Yes Medical Necessity Information: It is in your best interest to select a reason for this procedure from the list below. All of these items fulfill various CMS LCD requirements except lesion extends to a margin. Include Z78.9 (Other Specified Conditions Influencing Health Status) As An Associated Diagnosis?: No Medical Necessity Clause: This procedure was medically necessary because the lesion that was treated was: Lab: [id number] Lab Facility: [id number] Surgeon (Optional): MANJULA Good MD Biopsy Photograph Reviewed: Yes Size Of Lesion In Cm: 2.8 Size Of Margin In Cm: 0 Anesthesia Volume In Cc: 4 Eye Clamp Note Details: An eye clamp was used during the procedure. Excision Method: Elliptical Saucerization Depth: dermis and superficial adipose tissue Repair Type: Intermediate Intermediate / Complex Repair - Final Wound Length In Cm: 3.5 Suturegard Retention Suture: 2-0 Nylon Retention Suture Bite Size: 3 mm Length To Time In Minutes Device Was In Place: 10 Number Of Hemigard Strips Per Side: 1 Undermining Type: Entire Wound Debridement Text: The wound edges were debrided prior to proceeding with the closure to facilitate wound healing. Helical Rim Text: The closure involved the helical rim. Vermilion Border Text: The closure involved the vermilion border. Nostril Rim Text: The closure involved the nostril rim. Retention Suture Text: Retention sutures were placed to support the closure and prevent dehiscence. Suture Removal: 14 days Epidermal Closure Graft Donor Site (Optional): simple interrupted Graft Donor Site Bandage (Optional-Leave Blank If You Don't Want In Note): Steri-strips and a pressure bandage were applied to the donor site. Detail Level: Detailed Excision Depth: adipose tissue Scalpel Size: 15 blade Anesthesia Type: 1% lidocaine with epinephrine Additional Anesthesia Volume In Cc: 6 Hemostasis: Electrocautery Estimated Blood Loss (Cc): minimal Repair Depth: use same depth as excision depth Repair Anesthesia Method: local infiltration Anesthesia Volume In Cc: 5 Deep Sutures: 4-0 Polysorb Epidermal Sutures: 4-0 Prolene Wound Care: Bacitracin Dressing: dry sterile dressing Suturegard Intro: Intraoperative tissue expansion was performed, utilizing the SUTUREGARD device, in order to reduce wound tension. Suturegard Body: The suture ends were repeatedly re-tightened and re-clamped to achieve the desired tissue expansion. Hemigard Intro: Due to skin fragility and wound tension, it was decided to use HEMIGARD adhesive retention suture devices to permit a linear closure. The skin was cleaned and dried for a 6cm distance away from the wound. Excessive hair, if present, was removed to allow for adhesion. Hemigard Postcare Instructions: The HEMIGARD strips are to remain completely dry for at least 5-7 days. Positioning (Leave Blank If You Do Not Want): The patient was placed in a comfortable position exposing the surgical site. Complex Repair Preamble Text (Leave Blank If You Do Not Want): Extensive wide undermining was performed. Intermediate Repair Preamble Text (Leave Blank If You Do Not Want): Undermining was performed with blunt dissection. Fusiform Excision Additional Text (Leave Blank If You Do Not Want): The margin was drawn around the clinically apparent lesion.  A fusiform shape was then drawn on the skin incorporating the lesion and margins.  Incisions were then made along these lines to the appropriate tissue plane and the lesion was extirpated. Eliptical Excision Additional Text (Leave Blank If You Do Not Want): The margin was drawn around the clinically apparent lesion.  An elliptical shape was then drawn on the skin incorporating the lesion and margins.  Incisions were then made along these lines to the appropriate tissue plane and the lesion was extirpated. Saucerization Excision Additional Text (Leave Blank If You Do Not Want): The margin was drawn around the clinically apparent lesion.  Incisions were then made along these lines, in a tangential fashion, to the appropriate tissue plane and the lesion was extirpated. Slit Excision Additional Text (Leave Blank If You Do Not Want): A linear line was drawn on the skin overlying the lesion. An incision was made slowly until the lesion was visualized.  Once visualized, the lesion was removed with blunt dissection. Excisional Biopsy Additional Text (Leave Blank If You Do Not Want): The margin was drawn around the clinically apparent lesion. An elliptical shape was then drawn on the skin incorporating the lesion and margins.  Incisions were then made along these lines to the appropriate tissue plane and the lesion was extirpated. Perilesional Excision Additional Text (Leave Blank If You Do Not Want): The margin was drawn around the clinically apparent lesion. Incisions were then made along these lines to the appropriate tissue plane and the lesion was extirpated. Repair Performed By Another Provider Text (Leave Blank If You Do Not Want): After the tissue was excised the defect was repaired by another provider. No Repair - Repaired With Adjacent Surgical Defect Text (Leave Blank If You Do Not Want): After the excision the defect was repaired concurrently with another surgical defect which was in close approximation. Adjacent Tissue Transfer Text: The defect edges were debeveled with a #15 scalpel blade. Given the location of the defect and the proximity to free margins an adjacent tissue transfer was deemed most appropriate. Using a sterile surgical marker, an appropriate flap was drawn incorporating the defect and placing the expected incisions within the relaxed skin tension lines where possible. The area thus outlined was incised deep to adipose tissue with a #15 scalpel blade. The skin margins were undermined to an appropriate distance in all directions utilizing iris scissors and carried over to close the primary defect. Advancement Flap (Single) Text: The defect edges were debeveled with a #15 scalpel blade.  Given the location of the defect and the proximity to free margins a single advancement flap was deemed most appropriate.  Using a sterile surgical marker, an appropriate advancement flap was drawn incorporating the defect and placing the expected incisions within the relaxed skin tension lines where possible.    The area thus outlined was incised deep to adipose tissue with a #15 scalpel blade.  The skin margins were undermined to an appropriate distance in all directions utilizing iris scissors. Advancement Flap (Double) Text: The defect edges were debeveled with a #15 scalpel blade.  Given the location of the defect and the proximity to free margins a double advancement flap was deemed most appropriate.  Using a sterile surgical marker, the appropriate advancement flaps were drawn incorporating the defect and placing the expected incisions within the relaxed skin tension lines where possible.    The area thus outlined was incised deep to adipose tissue with a #15 scalpel blade.  The skin margins were undermined to an appropriate distance in all directions utilizing iris scissors. Burow's Advancement Flap Text: The defect edges were debeveled with a #15 scalpel blade.  Given the location of the defect and the proximity to free margins a Burow's advancement flap was deemed most appropriate.  Using a sterile surgical marker, the appropriate advancement flap was drawn incorporating the defect and placing the expected incisions within the relaxed skin tension lines where possible.    The area thus outlined was incised deep to adipose tissue with a #15 scalpel blade.  The skin margins were undermined to an appropriate distance in all directions utilizing iris scissors. Chonodrocutaneous Helical Advancement Flap Text: The defect edges were debeveled with a #15 scalpel blade. Given the location of the defect and the proximity to free margins a chondrocutaneous helical advancement flap was deemed most appropriate. Using a sterile surgical marker, the appropriate advancement flap was drawn incorporating the defect and placing the expected incisions within the relaxed skin tension lines where possible. The area thus outlined was incised deep to adipose tissue with a #15 scalpel blade. The skin margins were undermined to an appropriate distance in all directions utilizing iris scissors. Following this, the designed flap was advanced and carried over into the primary defect and sutured into place. Crescentic Advancement Flap Text: The defect edges were debeveled with a #15 scalpel blade.  Given the location of the defect and the proximity to free margins a crescentic advancement flap was deemed most appropriate.  Using a sterile surgical marker, the appropriate advancement flap was drawn incorporating the defect and placing the expected incisions within the relaxed skin tension lines where possible.    The area thus outlined was incised deep to adipose tissue with a #15 scalpel blade.  The skin margins were undermined to an appropriate distance in all directions utilizing iris scissors. A-T Advancement Flap Text: The defect edges were debeveled with a #15 scalpel blade.  Given the location of the defect, shape of the defect and the proximity to free margins an A-T advancement flap was deemed most appropriate.  Using a sterile surgical marker, an appropriate advancement flap was drawn incorporating the defect and placing the expected incisions within the relaxed skin tension lines where possible.    The area thus outlined was incised deep to adipose tissue with a #15 scalpel blade.  The skin margins were undermined to an appropriate distance in all directions utilizing iris scissors. O-T Advancement Flap Text: The defect edges were debeveled with a #15 scalpel blade.  Given the location of the defect, shape of the defect and the proximity to free margins an O-T advancement flap was deemed most appropriate.  Using a sterile surgical marker, an appropriate advancement flap was drawn incorporating the defect and placing the expected incisions within the relaxed skin tension lines where possible.    The area thus outlined was incised deep to adipose tissue with a #15 scalpel blade.  The skin margins were undermined to an appropriate distance in all directions utilizing iris scissors. O-L Flap Text: The defect edges were debeveled with a #15 scalpel blade.  Given the location of the defect, shape of the defect and the proximity to free margins an O-L flap was deemed most appropriate.  Using a sterile surgical marker, an appropriate advancement flap was drawn incorporating the defect and placing the expected incisions within the relaxed skin tension lines where possible.    The area thus outlined was incised deep to adipose tissue with a #15 scalpel blade.  The skin margins were undermined to an appropriate distance in all directions utilizing iris scissors. O-Z Flap Text: The defect edges were debeveled with a #15 scalpel blade. Given the location of the defect, shape of the defect and the proximity to free margins an O-Z flap was deemed most appropriate. Using a sterile surgical marker, an appropriate transposition flap was drawn incorporating the defect and placing the expected incisions within the relaxed skin tension lines where possible. The area thus outlined was incised deep to adipose tissue with a #15 scalpel blade. The skin margins were undermined to an appropriate distance in all directions utilizing iris scissors. Following this, the designed flap was carried over into the primary defect and sutured into place. Double O-Z Flap Text: The defect edges were debeveled with a #15 scalpel blade. Given the location of the defect, shape of the defect and the proximity to free margins a Double O-Z flap was deemed most appropriate. Using a sterile surgical marker, an appropriate transposition flap was drawn incorporating the defect and placing the expected incisions within the relaxed skin tension lines where possible. The area thus outlined was incised deep to adipose tissue with a #15 scalpel blade. The skin margins were undermined to an appropriate distance in all directions utilizing iris scissors. Following this, the designed flap was carried over into the primary defect and sutured into place. V-Y Flap Text: The defect edges were debeveled with a #15 scalpel blade.  Given the location of the defect, shape of the defect and the proximity to free margins a V-Y flap was deemed most appropriate.  Using a sterile surgical marker, an appropriate advancement flap was drawn incorporating the defect and placing the expected incisions within the relaxed skin tension lines where possible.    The area thus outlined was incised deep to adipose tissue with a #15 scalpel blade.  The skin margins were undermined to an appropriate distance in all directions utilizing iris scissors. Advancement-Rotation Flap Text: The defect edges were debeveled with a #15 scalpel blade. Given the location of the defect, shape of the defect and the proximity to free margins an advancement-rotation flap was deemed most appropriate. Using a sterile surgical marker, an appropriate flap was drawn incorporating the defect and placing the expected incisions within the relaxed skin tension lines where possible. The area thus outlined was incised deep to adipose tissue with a #15 scalpel blade. The skin margins were undermined to an appropriate distance in all directions utilizing iris scissors. Following this, the designed flap was carried over into the primary defect and sutured into place. Mercedes Flap Text: The defect edges were debeveled with a #15 scalpel blade. Given the location of the defect, shape of the defect and the proximity to free margins a Mercedes flap was deemed most appropriate. Using a sterile surgical marker, an appropriate advancement flap was drawn incorporating the defect and placing the expected incisions within the relaxed skin tension lines where possible. The area thus outlined was incised deep to adipose tissue with a #15 scalpel blade. The skin margins were undermined to an appropriate distance in all directions utilizing iris scissors. Following this, the designed flap was advanced and carried over into the primary defect and sutured into place. Modified Advancement Flap Text: The defect edges were debeveled with a #15 scalpel blade.  Given the location of the defect, shape of the defect and the proximity to free margins a modified advancement flap was deemed most appropriate.  Using a sterile surgical marker, an appropriate advancement flap was drawn incorporating the defect and placing the expected incisions within the relaxed skin tension lines where possible.    The area thus outlined was incised deep to adipose tissue with a #15 scalpel blade.  The skin margins were undermined to an appropriate distance in all directions utilizing iris scissors. Mucosal Advancement Flap Text: Given the location of the defect, shape of the defect and the proximity to free margins a mucosal advancement flap was deemed most appropriate. Incisions were made with a 15 blade scalpel in the appropriate fashion along the cutaneous vermillion border and the mucosal lip. The remaining actinically damaged mucosal tissue was excised.  The mucosal advancement flap was then elevated to the gingival sulcus with care taken to preserve the neurovascular structures and advanced into the primary defect. Care was taken to ensure that precise realignment of the vermilion border was achieved. Peng Advancement Flap Text: The defect edges were debeveled with a #15 scalpel blade. Given the location of the defect, shape of the defect and the proximity to free margins a Peng advancement flap was deemed most appropriate. Using a sterile surgical marker, an appropriate advancement flap was drawn incorporating the defect and placing the expected incisions within the relaxed skin tension lines where possible. The area thus outlined was incised deep to adipose tissue with a #15 scalpel blade. The skin margins were undermined to an appropriate distance in all directions utilizing iris scissors. Following this, the designed flap was advanced and carried over into the primary defect and sutured into place. Hatchet Flap Text: The defect edges were debeveled with a #15 scalpel blade.  Given the location of the defect, shape of the defect and the proximity to free margins a hatchet flap was deemed most appropriate.  Using a sterile surgical marker, an appropriate hatchet flap was drawn incorporating the defect and placing the expected incisions within the relaxed skin tension lines where possible.    The area thus outlined was incised deep to adipose tissue with a #15 scalpel blade.  The skin margins were undermined to an appropriate distance in all directions utilizing iris scissors. Rotation Flap Text: The defect edges were debeveled with a #15 scalpel blade.  Given the location of the defect, shape of the defect and the proximity to free margins a rotation flap was deemed most appropriate.  Using a sterile surgical marker, an appropriate rotation flap was drawn incorporating the defect and placing the expected incisions within the relaxed skin tension lines where possible.    The area thus outlined was incised deep to adipose tissue with a #15 scalpel blade.  The skin margins were undermined to an appropriate distance in all directions utilizing iris scissors. Bilateral Rotation Flap Text: The defect edges were debeveled with a #15 scalpel blade. Given the location of the defect, shape of the defect and the proximity to free margins a bilateral rotation flap was deemed most appropriate. Using a sterile surgical marker, an appropriate rotation flap was drawn incorporating the defect and placing the expected incisions within the relaxed skin tension lines where possible. The area thus outlined was incised deep to adipose tissue with a #15 scalpel blade. The skin margins were undermined to an appropriate distance in all directions utilizing iris scissors. Following this, the designed flap was carried over into the primary defect and sutured into place. Spiral Flap Text: The defect edges were debeveled with a #15 scalpel blade.  Given the location of the defect, shape of the defect and the proximity to free margins a spiral flap was deemed most appropriate.  Using a sterile surgical marker, an appropriate rotation flap was drawn incorporating the defect and placing the expected incisions within the relaxed skin tension lines where possible. The area thus outlined was incised deep to adipose tissue with a #15 scalpel blade.  The skin margins were undermined to an appropriate distance in all directions utilizing iris scissors. Staged Advancement Flap Text: The defect edges were debeveled with a #15 scalpel blade. Given the location of the defect, shape of the defect and the proximity to free margins a staged advancement flap was deemed most appropriate. Using a sterile surgical marker, an appropriate advancement flap was drawn incorporating the defect and placing the expected incisions within the relaxed skin tension lines where possible. The area thus outlined was incised deep to adipose tissue with a #15 scalpel blade. The skin margins were undermined to an appropriate distance in all directions utilizing iris scissors. Following this, the designed flap was carried over into the primary defect and sutured into place. Star Wedge Flap Text: The defect edges were debeveled with a #15 scalpel blade. Given the location of the defect, shape of the defect and the proximity to free margins a star wedge flap was deemed most appropriate. Using a sterile surgical marker, an appropriate rotation flap was drawn incorporating the defect and placing the expected incisions within the relaxed skin tension lines where possible. The area thus outlined was incised deep to adipose tissue with a #15 scalpel blade. The skin margins were undermined to an appropriate distance in all directions utilizing iris scissors. Following this, the designed flap was carried over into the primary defect and sutured into place. Transposition Flap Text: The defect edges were debeveled with a #15 scalpel blade.  Given the location of the defect and the proximity to free margins a transposition flap was deemed most appropriate.  Using a sterile surgical marker, an appropriate transposition flap was drawn incorporating the defect.    The area thus outlined was incised deep to adipose tissue with a #15 scalpel blade.  The skin margins were undermined to an appropriate distance in all directions utilizing iris scissors. Muscle Hinge Flap Text: The defect edges were debeveled with a #15 scalpel blade.  Given the size, depth and location of the defect and the proximity to free margins a muscle hinge flap was deemed most appropriate.  Using a sterile surgical marker, an appropriate hinge flap was drawn incorporating the defect. The area thus outlined was incised with a #15 scalpel blade.  The skin margins were undermined to an appropriate distance in all directions utilizing iris scissors. Mustarde Flap Text: The defect edges were debeveled with a #15 scalpel blade.  Given the size, depth and location of the defect and the proximity to free margins a Mustarde flap was deemed most appropriate. Using a sterile surgical marker, an appropriate flap was drawn incorporating the defect. The area thus outlined was incised with a #15 scalpel blade. The skin margins were undermined to an appropriate distance in all directions utilizing iris scissors. Following this, the designed flap was carried into the primary defect and sutured into place. Nasal Turnover Hinge Flap Text: The defect edges were debeveled with a #15 scalpel blade.  Given the size, depth, location of the defect and the defect being full thickness a nasal turnover hinge flap was deemed most appropriate. Using a sterile surgical marker, an appropriate hinge flap was drawn incorporating the defect. The area thus outlined was incised with a #15 scalpel blade. The flap was designed to recreate the nasal mucosal lining and the alar rim. The skin margins were undermined to an appropriate distance in all directions utilizing iris scissors. Following this, the designed flap was carried over into the primary defect and sutured into place Nasalis-Muscle-Based Myocutaneous Island Pedicle Flap Text: Using a #15 blade, an incision was made around the donor flap to the level of the nasalis muscle. Wide lateral undermining was then performed in both the subcutaneous plane above the nasalis muscle, and in a submuscular plane just above periosteum. This allowed the formation of a free nasalis muscle axial pedicle (based on the angular artery) which was still attached to the actual cutaneous flap, increasing its mobility and vascular viability. Hemostasis was obtained with pinpoint electrocoagulation. The flap was mobilized into position and the pivotal anchor points positioned and stabilized with buried interrupted sutures. Subcutaneous and dermal tissues were closed in a multilayered fashion with sutures. Tissue redundancies were excised, and the epidermal edges were apposed without significant tension and sutured with sutures. Nasalis Myocutaneous Flap Text: Using a #15 blade, an incision was made around the donor flap to the level of the nasalis muscle. Wide lateral undermining was then performed in both the subcutaneous plane above the nasalis muscle, and in a submuscular plane just above periosteum. This allowed the formation of a free nasalis muscle axial pedicle which was still attached to the actual cutaneous flap, increasing its mobility and vascular viability. Hemostasis was obtained with pinpoint electrocoagulation. The flap was mobilized into position and the pivotal anchor points positioned and stabilized with buried interrupted sutures. Subcutaneous and dermal tissues were closed in a multilayered fashion with sutures. Tissue redundancies were excised, and the epidermal edges were apposed without significant tension and sutured with sutures. Nasolabial Transposition Flap Text: The defect edges were debeveled with a #15 scalpel blade.  Given the size, depth and location of the defect and the proximity to free margins a nasolabial transposition flap was deemed most appropriate. Using a sterile surgical marker, an appropriate flap was drawn incorporating the defect. The area thus outlined was incised with a #15 scalpel blade. The skin margins were undermined to an appropriate distance in all directions utilizing iris scissors. Following this, the designed flap was carried into the primary defect and sutured into place. Orbicularis Oris Muscle Flap Text: The defect edges were debeveled with a #15 scalpel blade.  Given that the defect affected the competency of the oral sphincter an orbicularis oris muscle flap was deemed most appropriate to restore this competency and normal muscle function.  Using a sterile surgical marker, an appropriate flap was drawn incorporating the defect. The area thus outlined was incised with a #15 scalpel blade. Following this, the designed flap was carried over into the primary defect and sutured into place. Melolabial Transposition Flap Text: The defect edges were debeveled with a #15 scalpel blade.  Given the location of the defect and the proximity to free margins a melolabial flap was deemed most appropriate.  Using a sterile surgical marker, an appropriate melolabial transposition flap was drawn incorporating the defect.    The area thus outlined was incised deep to adipose tissue with a #15 scalpel blade.  The skin margins were undermined to an appropriate distance in all directions utilizing iris scissors. Rectangular Flap Text: The defect edges were debeveled with a #15 scalpel blade. Given the location of the defect and the proximity to free margins a rectangular flap was deemed most appropriate. Using a sterile surgical marker, an appropriate rectangular flap was drawn incorporating the defect. The area thus outlined was incised deep to adipose tissue with a #15 scalpel blade. The skin margins were undermined to an appropriate distance in all directions utilizing iris scissors. Following this, the designed flap was carried over into the primary defect and sutured into place. Rhombic Flap Text: The defect edges were debeveled with a #15 scalpel blade.  Given the location of the defect and the proximity to free margins a rhombic flap was deemed most appropriate.  Using a sterile surgical marker, an appropriate rhombic flap was drawn incorporating the defect.    The area thus outlined was incised deep to adipose tissue with a #15 scalpel blade.  The skin margins were undermined to an appropriate distance in all directions utilizing iris scissors. Rhomboid Transposition Flap Text: The defect edges were debeveled with a #15 scalpel blade. Given the location of the defect and the proximity to free margins a rhomboid transposition flap was deemed most appropriate. Using a sterile surgical marker, an appropriate rhomboid flap was drawn incorporating the defect. The area thus outlined was incised deep to adipose tissue with a #15 scalpel blade. The skin margins were undermined to an appropriate distance in all directions utilizing iris scissors. Following this, the designed flap was carried over into the primary defect and sutured into place. Bi-Rhombic Flap Text: The defect edges were debeveled with a #15 scalpel blade.  Given the location of the defect and the proximity to free margins a bi-rhombic flap was deemed most appropriate.  Using a sterile surgical marker, an appropriate rhombic flap was drawn incorporating the defect. The area thus outlined was incised deep to adipose tissue with a #15 scalpel blade.  The skin margins were undermined to an appropriate distance in all directions utilizing iris scissors. Helical Rim Advancement Flap Text: The defect edges were debeveled with a #15 blade scalpel.  Given the location of the defect and the proximity to free margins (helical rim) a double helical rim advancement flap was deemed most appropriate.  Using a sterile surgical marker, the appropriate advancement flaps were drawn incorporating the defect and placing the expected incisions between the helical rim and antihelix where possible.  The area thus outlined was incised through and through with a #15 scalpel blade.  With a skin hook and iris scissors, the flaps were gently and sharply undermined and freed up. Bilateral Helical Rim Advancement Flap Text: The defect edges were debeveled with a #15 blade scalpel.  Given the location of the defect and the proximity to free margins (helical rim) a bilateral helical rim advancement flap was deemed most appropriate.  Using a sterile surgical marker, the appropriate advancement flaps were drawn incorporating the defect and placing the expected incisions between the helical rim and antihelix where possible.  The area thus outlined was incised through and through with a #15 scalpel blade.  With a skin hook and iris scissors, the flaps were gently and sharply undermined and freed up. Ear Star Wedge Flap Text: The defect edges were debeveled with a #15 blade scalpel.  Given the location of the defect and the proximity to free margins (helical rim) an ear star wedge flap was deemed most appropriate.  Using a sterile surgical marker, the appropriate flap was drawn incorporating the defect and placing the expected incisions between the helical rim and antihelix where possible.  The area thus outlined was incised through and through with a #15 scalpel blade. Flip-Flop Flap Text: The defect edges were debeveled with a #15 blade scalpel.  Given the location of the defect and the proximity to free margins a flip-flop flap was deemed most appropriate. Using a sterile surgical marker, the appropriate flap was drawn incorporating the defect and placing the expected incisions between the helical rim and antihelix where possible.  The area thus outlined was incised through and through with a #15 scalpel blade. Following this, the designed flap was carried over into the primary defect and sutured into place. Banner Transposition Flap Text: The defect edges were debeveled with a #15 scalpel blade. Given the location of the defect and the proximity to free margins a Banner transposition flap was deemed most appropriate. Using a sterile surgical marker, an appropriate flap was drawn around the defect. The area thus outlined was incised deep to adipose tissue with a #15 scalpel blade. The skin margins were undermined to an appropriate distance in all directions utilizing iris scissors. Following this, the designed flap was carried into the primary defect and sutured into place. Bilobed Flap Text: The defect edges were debeveled with a #15 scalpel blade.  Given the location of the defect and the proximity to free margins a bilobe flap was deemed most appropriate.  Using a sterile surgical marker, an appropriate bilobe flap drawn around the defect.    The area thus outlined was incised deep to adipose tissue with a #15 scalpel blade.  The skin margins were undermined to an appropriate distance in all directions utilizing iris scissors. Bilobed Transposition Flap Text: The defect edges were debeveled with a #15 scalpel blade. Given the location of the defect and the proximity to free margins a bilobed transposition flap was deemed most appropriate. Using a sterile surgical marker, an appropriate bilobe flap drawn around the defect. The area thus outlined was incised deep to adipose tissue with a #15 scalpel blade. The skin margins were undermined to an appropriate distance in all directions utilizing iris scissors. Following this, the designed flap was carried over into the primary defect and sutured into place. Trilobed Flap Text: The defect edges were debeveled with a #15 scalpel blade.  Given the location of the defect and the proximity to free margins a trilobed flap was deemed most appropriate.  Using a sterile surgical marker, an appropriate trilobed flap drawn around the defect.    The area thus outlined was incised deep to adipose tissue with a #15 scalpel blade.  The skin margins were undermined to an appropriate distance in all directions utilizing iris scissors. Dorsal Nasal Flap Text: The defect edges were debeveled with a #15 scalpel blade.  Given the location of the defect and the proximity to free margins a dorsal nasal flap was deemed most appropriate.  Using a sterile surgical marker, an appropriate dorsal nasal flap was drawn around the defect.    The area thus outlined was incised deep to adipose tissue with a #15 scalpel blade.  The skin margins were undermined to an appropriate distance in all directions utilizing iris scissors. Island Pedicle Flap Text: The defect edges were debeveled with a #15 scalpel blade.  Given the location of the defect, shape of the defect and the proximity to free margins an island pedicle advancement flap was deemed most appropriate.  Using a sterile surgical marker, an appropriate advancement flap was drawn incorporating the defect, outlining the appropriate donor tissue and placing the expected incisions within the relaxed skin tension lines where possible.    The area thus outlined was incised deep to adipose tissue with a #15 scalpel blade.  The skin margins were undermined to an appropriate distance in all directions around the primary defect and laterally outward around the island pedicle utilizing iris scissors.  There was minimal undermining beneath the pedicle flap. Island Pedicle Flap With Canthal Suspension Text: The defect edges were debeveled with a #15 scalpel blade.  Given the location of the defect, shape of the defect and the proximity to free margins an island pedicle advancement flap was deemed most appropriate.  Using a sterile surgical marker, an appropriate advancement flap was drawn incorporating the defect, outlining the appropriate donor tissue and placing the expected incisions within the relaxed skin tension lines where possible. The area thus outlined was incised deep to adipose tissue with a #15 scalpel blade.  The skin margins were undermined to an appropriate distance in all directions around the primary defect and laterally outward around the island pedicle utilizing iris scissors.  There was minimal undermining beneath the pedicle flap. A suspension suture was placed in the canthal tendon to prevent tension and prevent ectropion. Alar Island Pedicle Flap Text: The defect edges were debeveled with a #15 scalpel blade.  Given the location of the defect, shape of the defect and the proximity to the alar rim an island pedicle advancement flap was deemed most appropriate.  Using a sterile surgical marker, an appropriate advancement flap was drawn incorporating the defect, outlining the appropriate donor tissue and placing the expected incisions within the nasal ala running parallel to the alar rim. The area thus outlined was incised with a #15 scalpel blade.  The skin margins were undermined minimally to an appropriate distance in all directions around the primary defect and laterally outward around the island pedicle utilizing iris scissors.  There was minimal undermining beneath the pedicle flap. Double Island Pedicle Flap Text: The defect edges were debeveled with a #15 scalpel blade.  Given the location of the defect, shape of the defect and the proximity to free margins a double island pedicle advancement flap was deemed most appropriate.  Using a sterile surgical marker, an appropriate advancement flap was drawn incorporating the defect, outlining the appropriate donor tissue and placing the expected incisions within the relaxed skin tension lines where possible.    The area thus outlined was incised deep to adipose tissue with a #15 scalpel blade.  The skin margins were undermined to an appropriate distance in all directions around the primary defect and laterally outward around the island pedicle utilizing iris scissors.  There was minimal undermining beneath the pedicle flap. Island Pedicle Flap-Requiring Vessel Identification Text: The defect edges were debeveled with a #15 scalpel blade.  Given the location of the defect, shape of the defect and the proximity to free margins an island pedicle advancement flap was deemed most appropriate.  Using a sterile surgical marker, an appropriate advancement flap was drawn, based on the axial vessel mentioned above, incorporating the defect, outlining the appropriate donor tissue and placing the expected incisions within the relaxed skin tension lines where possible.    The area thus outlined was incised deep to adipose tissue with a #15 scalpel blade.  The skin margins were undermined to an appropriate distance in all directions around the primary defect and laterally outward around the island pedicle utilizing iris scissors.  There was minimal undermining beneath the pedicle flap. Keystone Flap Text: The defect edges were debeveled with a #15 scalpel blade. Given the location of the defect, shape of the defect a keystone flap was deemed most appropriate. Using a sterile surgical marker, an appropriate keystone flap was drawn incorporating the defect, outlining the appropriate donor tissue and placing the expected incisions within the relaxed skin tension lines where possible. The area thus outlined was incised deep to adipose tissue with a #15 scalpel blade. The skin margins were undermined to an appropriate distance in all directions around the primary defect and laterally outward around the flap utilizing iris scissors. Following this, the designed flap was carried into the primary defect and sutured into place. O-T Plasty Text: The defect edges were debeveled with a #15 scalpel blade.  Given the location of the defect, shape of the defect and the proximity to free margins an O-T plasty was deemed most appropriate.  Using a sterile surgical marker, an appropriate O-T plasty was drawn incorporating the defect and placing the expected incisions within the relaxed skin tension lines where possible.    The area thus outlined was incised deep to adipose tissue with a #15 scalpel blade.  The skin margins were undermined to an appropriate distance in all directions utilizing iris scissors. O-Z Plasty Text: The defect edges were debeveled with a #15 scalpel blade.  Given the location of the defect, shape of the defect and the proximity to free margins an O-Z plasty (double transposition flap) was deemed most appropriate.  Using a sterile surgical marker, the appropriate transposition flaps were drawn incorporating the defect and placing the expected incisions within the relaxed skin tension lines where possible.    The area thus outlined was incised deep to adipose tissue with a #15 scalpel blade.  The skin margins were undermined to an appropriate distance in all directions utilizing iris scissors.  Hemostasis was achieved with electrocautery.  The flaps were then transposed into place, one clockwise and the other counterclockwise, and anchored with interrupted buried subcutaneous sutures. Double O-Z Plasty Text: The defect edges were debeveled with a #15 scalpel blade. Given the location of the defect, shape of the defect and the proximity to free margins a Double O-Z plasty (double transposition flap) was deemed most appropriate. Using a sterile surgical marker, the appropriate transposition flaps were drawn incorporating the defect and placing the expected incisions within the relaxed skin tension lines where possible. The area thus outlined was incised deep to adipose tissue with a #15 scalpel blade. The skin margins were undermined to an appropriate distance in all directions utilizing iris scissors. Hemostasis was achieved with electrocautery. The flaps were then transposed and carried over into place, one clockwise and the other counterclockwise, and anchored with interrupted buried subcutaneous sutures. V-Y Plasty Text: The defect edges were debeveled with a #15 scalpel blade.  Given the location of the defect, shape of the defect and the proximity to free margins an V-Y advancement flap was deemed most appropriate.  Using a sterile surgical marker, an appropriate advancement flap was drawn incorporating the defect and placing the expected incisions within the relaxed skin tension lines where possible.    The area thus outlined was incised deep to adipose tissue with a #15 scalpel blade.  The skin margins were undermined to an appropriate distance in all directions utilizing iris scissors. H Plasty Text: Given the location of the defect, shape of the defect and the proximity to free margins a H-plasty was deemed most appropriate for repair.  Using a sterile surgical marker, the appropriate advancement arms of the H-plasty were drawn incorporating the defect and placing the expected incisions within the relaxed skin tension lines where possible. The area thus outlined was incised deep to adipose tissue with a #15 scalpel blade. The skin margins were undermined to an appropriate distance in all directions utilizing iris scissors.  The opposing advancement arms were then advanced into place in opposite direction and anchored with interrupted buried subcutaneous sutures. W Plasty Text: The lesion was extirpated to the level of the fat with a #15 scalpel blade.  Given the location of the defect, shape of the defect and the proximity to free margins a W-plasty was deemed most appropriate for repair.  Using a sterile surgical marker, the appropriate transposition arms of the W-plasty were drawn incorporating the defect and placing the expected incisions within the relaxed skin tension lines where possible.    The area thus outlined was incised deep to adipose tissue with a #15 scalpel blade.  The skin margins were undermined to an appropriate distance in all directions utilizing iris scissors.  The opposing transposition arms were then transposed into place in opposite direction and anchored with interrupted buried subcutaneous sutures. Z Plasty Text: The lesion was extirpated to the level of the fat with a #15 scalpel blade.  Given the location of the defect, shape of the defect and the proximity to free margins a Z-plasty was deemed most appropriate for repair.  Using a sterile surgical marker, the appropriate transposition arms of the Z-plasty were drawn incorporating the defect and placing the expected incisions within the relaxed skin tension lines where possible.    The area thus outlined was incised deep to adipose tissue with a #15 scalpel blade.  The skin margins were undermined to an appropriate distance in all directions utilizing iris scissors.  The opposing transposition arms were then transposed into place in opposite direction and anchored with interrupted buried subcutaneous sutures. Double Z Plasty Text: The lesion was extirpated to the level of the fat with a #15 scalpel blade. Given the location of the defect, shape of the defect and the proximity to free margins a double Z-plasty was deemed most appropriate for repair. Using a sterile surgical marker, the appropriate transposition arms of the double Z-plasty were drawn incorporating the defect and placing the expected incisions within the relaxed skin tension lines where possible. The area thus outlined was incised deep to adipose tissue with a #15 scalpel blade. The skin margins were undermined to an appropriate distance in all directions utilizing iris scissors. The opposing transposition arms were then transposed and carried over into place in opposite direction and anchored with interrupted buried subcutaneous sutures. Zygomaticofacial Flap Text: Given the location of the defect, shape of the defect and the proximity to free margins a zygomaticofacial flap was deemed most appropriate for repair. Using a sterile surgical marker, the appropriate flap was drawn incorporating the defect and placing the expected incisions within the relaxed skin tension lines where possible. The area thus outlined was incised deep to adipose tissue with a #15 scalpel blade with preservation of a vascular pedicle.  The skin margins were undermined to an appropriate distance in all directions utilizing iris scissors. The flap was then carried over into the defect and anchored with interrupted buried subcutaneous sutures. Cheek Interpolation Flap Text: A decision was made to reconstruct the defect utilizing an interpolation axial flap and a staged reconstruction.  A telfa template was made of the defect.  This telfa template was then used to outline the Cheek Interpolation flap.  The donor area for the pedicle flap was then injected with anesthesia.  The flap was excised through the skin and subcutaneous tissue down to the layer of the underlying musculature.  The interpolation flap was carefully excised within this deep plane to maintain its blood supply.  The edges of the donor site were undermined.   The donor site was closed in a primary fashion.  The pedicle was then rotated into position and sutured.  Once the tube was sutured into place, adequate blood supply was confirmed with blanching and refill.  The pedicle was then wrapped with xeroform gauze and dressed appropriately with a telfa and gauze bandage to ensure continued blood supply and protect the attached pedicle. Cheek-To-Nose Interpolation Flap Text: A decision was made to reconstruct the defect utilizing an interpolation axial flap and a staged reconstruction.  A telfa template was made of the defect.  This telfa template was then used to outline the Cheek-To-Nose Interpolation flap.  The donor area for the pedicle flap was then injected with anesthesia.  The flap was excised through the skin and subcutaneous tissue down to the layer of the underlying musculature.  The interpolation flap was carefully excised within this deep plane to maintain its blood supply.  The edges of the donor site were undermined.   The donor site was closed in a primary fashion.  The pedicle was then rotated into position and sutured.  Once the tube was sutured into place, adequate blood supply was confirmed with blanching and refill.  The pedicle was then wrapped with xeroform gauze and dressed appropriately with a telfa and gauze bandage to ensure continued blood supply and protect the attached pedicle. Interpolation Flap Text: A decision was made to reconstruct the defect utilizing an interpolation axial flap and a staged reconstruction.  A telfa template was made of the defect.  This telfa template was then used to outline the interpolation flap.  The donor area for the pedicle flap was then injected with anesthesia.  The flap was excised through the skin and subcutaneous tissue down to the layer of the underlying musculature.  The interpolation flap was carefully excised within this deep plane to maintain its blood supply.  The edges of the donor site were undermined.   The donor site was closed in a primary fashion.  The pedicle was then rotated into position and sutured.  Once the tube was sutured into place, adequate blood supply was confirmed with blanching and refill.  The pedicle was then wrapped with xeroform gauze and dressed appropriately with a telfa and gauze bandage to ensure continued blood supply and protect the attached pedicle. Melolabial Interpolation Flap Text: A decision was made to reconstruct the defect utilizing an interpolation axial flap and a staged reconstruction.  A telfa template was made of the defect.  This telfa template was then used to outline the melolabial interpolation flap.  The donor area for the pedicle flap was then injected with anesthesia.  The flap was excised through the skin and subcutaneous tissue down to the layer of the underlying musculature.  The pedicle flap was carefully excised within this deep plane to maintain its blood supply.  The edges of the donor site were undermined.   The donor site was closed in a primary fashion.  The pedicle was then rotated into position and sutured.  Once the tube was sutured into place, adequate blood supply was confirmed with blanching and refill.  The pedicle was then wrapped with xeroform gauze and dressed appropriately with a telfa and gauze bandage to ensure continued blood supply and protect the attached pedicle. Mastoid Interpolation Flap Text: A decision was made to reconstruct the defect utilizing an interpolation axial flap and a staged reconstruction.  A telfa template was made of the defect.  This telfa template was then used to outline the mastoid interpolation flap.  The donor area for the pedicle flap was then injected with anesthesia.  The flap was excised through the skin and subcutaneous tissue down to the layer of the underlying musculature.  The pedicle flap was carefully excised within this deep plane to maintain its blood supply.  The edges of the donor site were undermined.   The donor site was closed in a primary fashion.  The pedicle was then rotated into position and sutured.  Once the tube was sutured into place, adequate blood supply was confirmed with blanching and refill.  The pedicle was then wrapped with xeroform gauze and dressed appropriately with a telfa and gauze bandage to ensure continued blood supply and protect the attached pedicle. Posterior Auricular Interpolation Flap Text: A decision was made to reconstruct the defect utilizing an interpolation axial flap and a staged reconstruction.  A telfa template was made of the defect.  This telfa template was then used to outline the posterior auricular interpolation flap.  The donor area for the pedicle flap was then injected with anesthesia.  The flap was excised through the skin and subcutaneous tissue down to the layer of the underlying musculature.  The pedicle flap was carefully excised within this deep plane to maintain its blood supply.  The edges of the donor site were undermined.   The donor site was closed in a primary fashion.  The pedicle was then rotated into position and sutured.  Once the tube was sutured into place, adequate blood supply was confirmed with blanching and refill.  The pedicle was then wrapped with xeroform gauze and dressed appropriately with a telfa and gauze bandage to ensure continued blood supply and protect the attached pedicle. Paramedian Forehead Flap Text: A decision was made to reconstruct the defect utilizing an interpolation axial flap and a staged reconstruction.  A telfa template was made of the defect.  This telfa template was then used to outline the paramedian forehead pedicle flap.  The donor area for the pedicle flap was then injected with anesthesia.  The flap was excised through the skin and subcutaneous tissue down to the layer of the underlying musculature.  The pedicle flap was carefully excised within this deep plane to maintain its blood supply.  The edges of the donor site were undermined.   The donor site was closed in a primary fashion.  The pedicle was then rotated into position and sutured.  Once the tube was sutured into place, adequate blood supply was confirmed with blanching and refill.  The pedicle was then wrapped with xeroform gauze and dressed appropriately with a telfa and gauze bandage to ensure continued blood supply and protect the attached pedicle. Abbe Flap (Upper To Lower Lip) Text: The defect of the lower lip was assessed and measured.  Given the location and size of the defect, an Abbe flap was deemed most appropriate. Using a sterile surgical marker, an appropriate Abbe flap was measured and drawn on the upper lip. Local anesthesia was then infiltrated.  A scalpel was then used to incise the upper lip through and through the skin, vermilion, muscle and mucosa, leaving the flap pedicled on the opposite side.  The flap was then rotated and transferred to the lower lip defect.  The flap was then sutured into place with a three layer technique, closing the orbicularis oris muscle layer with subcutaneous buried sutures, followed by a mucosal layer and an epidermal layer. Abbe Flap (Lower To Upper Lip) Text: The defect of the upper lip was assessed and measured.  Given the location and size of the defect, an Abbe flap was deemed most appropriate. Using a sterile surgical marker, an appropriate Abbe flap was measured and drawn on the lower lip. Local anesthesia was then infiltrated. A scalpel was then used to incise the upper lip through and through the skin, vermilion, muscle and mucosa, leaving the flap pedicled on the opposite side.  The flap was then rotated and transferred to the lower lip defect.  The flap was then sutured into place with a three layer technique, closing the orbicularis oris muscle layer with subcutaneous buried sutures, followed by a mucosal layer and an epidermal layer. Estlander Flap (Upper To Lower Lip) Text: The defect of the lower lip was assessed and measured.  Given the location and size of the defect, an Estlander flap was deemed most appropriate. Using a sterile surgical marker, an appropriate Estlander flap was measured and drawn on the upper lip. Local anesthesia was then infiltrated. A scalpel was then used to incise the lateral aspect of the flap, through skin, muscle and mucosa, leaving the flap pedicled medially.  The flap was then rotated and positioned to fill the lower lip defect.  The flap was then sutured into place with a three layer technique, closing the orbicularis oris muscle layer with subcutaneous buried sutures, followed by a mucosal layer and an epidermal layer. Lip Wedge Excision Repair Text: Given the location of the defect and the proximity to free margins a full thickness wedge repair was deemed most appropriate.  Using a sterile surgical marker, the appropriate repair was drawn incorporating the defect and placing the expected incisions perpendicular to the vermilion border.  The vermilion border was also meticulously outlined to ensure appropriate reapproximation during the repair.  The area thus outlined was incised through and through with a #15 scalpel blade.  The muscularis and dermis were reaproximated with deep sutures following hemostasis. Care was taken to realign the vermilion border before proceeding with the superficial closure.  Once the vermilion was realigned the superfical and mucosal closure was finished. Ftsg Text: The defect edges were debeveled with a #15 scalpel blade.  Given the location of the defect, shape of the defect and the proximity to free margins a full thickness skin graft was deemed most appropriate.  Using a sterile surgical marker, the primary defect shape was transferred to the donor site. The area thus outlined was incised deep to adipose tissue with a #15 scalpel blade.  The harvested graft was then trimmed of adipose tissue until only dermis and epidermis was left.  The skin margins of the secondary defect were undermined to an appropriate distance in all directions utilizing iris scissors.  The secondary defect was closed with interrupted buried subcutaneous sutures.  The skin edges were then re-apposed with running  sutures.  The skin graft was then placed in the primary defect and oriented appropriately. Split-Thickness Skin Graft Text: The defect edges were debeveled with a #15 scalpel blade.  Given the location of the defect, shape of the defect and the proximity to free margins a split thickness skin graft was deemed most appropriate.  Using a sterile surgical marker, the primary defect shape was transferred to the donor site. The split thickness graft was then harvested.  The skin graft was then placed in the primary defect and oriented appropriately. Pinch Graft Text: The defect edges were debeveled with a #15 scalpel blade. Given the location of the defect, shape of the defect and the proximity to free margins a pinch graft was deemed most appropriate. Using a sterile surgical marker, the primary defect shape was transferred to the donor site. The area thus outlined was incised deep to adipose tissue with a #15 scalpel blade.  The harvested graft was then trimmed of adipose tissue until only dermis and epidermis was left. The skin graft was then placed in the primary defect and oriented appropriately. Burow's Graft Text: The defect edges were debeveled with a #15 scalpel blade. Given the location of the defect, shape of the defect, the proximity to free margins and the presence of a standing cone deformity a Burow's skin graft was deemed most appropriate. The standing cone was removed and this tissue was then trimmed to the shape of the primary defect. The adipose tissue was also removed until only dermis and epidermis were left.  The skin graft was then placed in the primary defect and oriented appropriately. Cartilage Graft Text: The defect edges were debeveled with a #15 scalpel blade.  Given the location of the defect, shape of the defect, the fact the defect involved a full thickness cartilage defect a cartilage graft was deemed most appropriate.  An appropriate donor site was identified, cleansed, and anesthetized. The cartilage graft was then harvested and transferred to the recipient site, oriented appropriately and then sutured into place.  The secondary defect was then repaired using a primary closure. Composite Graft Text: The defect edges were debeveled with a #15 scalpel blade.  Given the location of the defect, shape of the defect, the proximity to free margins and the fact the defect was full thickness a composite graft was deemed most appropriate.  The defect was outline and then transferred to the donor site.  A full thickness graft was then excised from the donor site. The graft was then placed in the primary defect, oriented appropriately and then sutured into place.  The secondary defect was then repaired using a primary closure. Epidermal Autograft Text: The defect edges were debeveled with a #15 scalpel blade.  Given the location of the defect, shape of the defect and the proximity to free margins an epidermal autograft was deemed most appropriate.  Using a sterile surgical marker, the primary defect shape was transferred to the donor site. The epidermal graft was then harvested.  The skin graft was then placed in the primary defect and oriented appropriately. Dermal Autograft Text: The defect edges were debeveled with a #15 scalpel blade.  Given the location of the defect, shape of the defect and the proximity to free margins a dermal autograft was deemed most appropriate.  Using a sterile surgical marker, the primary defect shape was transferred to the donor site. The area thus outlined was incised deep to adipose tissue with a #15 scalpel blade.  The harvested graft was then trimmed of adipose and epidermal tissue until only dermis was left.  The skin graft was then placed in the primary defect and oriented appropriately. Skin Substitute Text: The defect edges were debeveled with a #15 scalpel blade. Given the location of the defect, shape of the defect and the proximity to free margins a skin substitute graft was deemed most appropriate.  The graft material was trimmed to fit the size of the defect. The graft was then placed in the primary defect and oriented appropriately. Tissue Cultured Epidermal Autograft Text: The defect edges were debeveled with a #15 scalpel blade.  Given the location of the defect, shape of the defect and the proximity to free margins a tissue cultured epidermal autograft was deemed most appropriate.  The graft was then trimmed to fit the size of the defect.  The graft was then placed in the primary defect and oriented appropriately. Xenograft Text: The defect edges were debeveled with a #15 scalpel blade.  Given the location of the defect, shape of the defect and the proximity to free margins a xenograft was deemed most appropriate.  The graft was then trimmed to fit the size of the defect.  The graft was then placed in the primary defect and oriented appropriately. Purse String (Intermediate) Text: Given the location of the defect and the characteristics of the surrounding skin a pursestring intermediate closure was deemed most appropriate.  Undermining was performed circumfirentially around the surgical defect.  A purstring suture was then placed and tightened. Purse String (Simple) Text: Given the location of the defect and the characteristics of the surrounding skin a purse string simple closure was deemed most appropriate.  Undermining was performed circumferentially around the surgical defect.  A purse string suture was then placed and tightened. Complex Repair And Single Advancement Flap Text: The defect edges were debeveled with a #15 scalpel blade.  The primary defect was closed partially with a complex linear closure.  Given the location of the remaining defect, shape of the defect and the proximity to free margins a single advancement flap was deemed most appropriate for complete closure of the defect.  Using a sterile surgical marker, an appropriate advancement flap was drawn incorporating the defect and placing the expected incisions within the relaxed skin tension lines where possible.    The area thus outlined was incised deep to adipose tissue with a #15 scalpel blade.  The skin margins were undermined to an appropriate distance in all directions utilizing iris scissors. Complex Repair And Double Advancement Flap Text: The defect edges were debeveled with a #15 scalpel blade.  The primary defect was closed partially with a complex linear closure.  Given the location of the remaining defect, shape of the defect and the proximity to free margins a double advancement flap was deemed most appropriate for complete closure of the defect.  Using a sterile surgical marker, an appropriate advancement flap was drawn incorporating the defect and placing the expected incisions within the relaxed skin tension lines where possible.    The area thus outlined was incised deep to adipose tissue with a #15 scalpel blade.  The skin margins were undermined to an appropriate distance in all directions utilizing iris scissors. Complex Repair And Modified Advancement Flap Text: The defect edges were debeveled with a #15 scalpel blade.  The primary defect was closed partially with a complex linear closure.  Given the location of the remaining defect, shape of the defect and the proximity to free margins a modified advancement flap was deemed most appropriate for complete closure of the defect.  Using a sterile surgical marker, an appropriate advancement flap was drawn incorporating the defect and placing the expected incisions within the relaxed skin tension lines where possible.    The area thus outlined was incised deep to adipose tissue with a #15 scalpel blade.  The skin margins were undermined to an appropriate distance in all directions utilizing iris scissors. Complex Repair And A-T Advancement Flap Text: The defect edges were debeveled with a #15 scalpel blade.  The primary defect was closed partially with a complex linear closure.  Given the location of the remaining defect, shape of the defect and the proximity to free margins an A-T advancement flap was deemed most appropriate for complete closure of the defect.  Using a sterile surgical marker, an appropriate advancement flap was drawn incorporating the defect and placing the expected incisions within the relaxed skin tension lines where possible.    The area thus outlined was incised deep to adipose tissue with a #15 scalpel blade.  The skin margins were undermined to an appropriate distance in all directions utilizing iris scissors. Complex Repair And O-T Advancement Flap Text: The defect edges were debeveled with a #15 scalpel blade.  The primary defect was closed partially with a complex linear closure.  Given the location of the remaining defect, shape of the defect and the proximity to free margins an O-T advancement flap was deemed most appropriate for complete closure of the defect.  Using a sterile surgical marker, an appropriate advancement flap was drawn incorporating the defect and placing the expected incisions within the relaxed skin tension lines where possible.    The area thus outlined was incised deep to adipose tissue with a #15 scalpel blade.  The skin margins were undermined to an appropriate distance in all directions utilizing iris scissors. Complex Repair And O-L Flap Text: The defect edges were debeveled with a #15 scalpel blade.  The primary defect was closed partially with a complex linear closure.  Given the location of the remaining defect, shape of the defect and the proximity to free margins an O-L flap was deemed most appropriate for complete closure of the defect.  Using a sterile surgical marker, an appropriate flap was drawn incorporating the defect and placing the expected incisions within the relaxed skin tension lines where possible.    The area thus outlined was incised deep to adipose tissue with a #15 scalpel blade.  The skin margins were undermined to an appropriate distance in all directions utilizing iris scissors. Complex Repair And Bilobe Flap Text: The defect edges were debeveled with a #15 scalpel blade.  The primary defect was closed partially with a complex linear closure.  Given the location of the remaining defect, shape of the defect and the proximity to free margins a bilobe flap was deemed most appropriate for complete closure of the defect.  Using a sterile surgical marker, an appropriate advancement flap was drawn incorporating the defect and placing the expected incisions within the relaxed skin tension lines where possible.    The area thus outlined was incised deep to adipose tissue with a #15 scalpel blade.  The skin margins were undermined to an appropriate distance in all directions utilizing iris scissors. Complex Repair And Melolabial Flap Text: The defect edges were debeveled with a #15 scalpel blade.  The primary defect was closed partially with a complex linear closure.  Given the location of the remaining defect, shape of the defect and the proximity to free margins a melolabial flap was deemed most appropriate for complete closure of the defect.  Using a sterile surgical marker, an appropriate advancement flap was drawn incorporating the defect and placing the expected incisions within the relaxed skin tension lines where possible.    The area thus outlined was incised deep to adipose tissue with a #15 scalpel blade.  The skin margins were undermined to an appropriate distance in all directions utilizing iris scissors. Complex Repair And Rotation Flap Text: The defect edges were debeveled with a #15 scalpel blade.  The primary defect was closed partially with a complex linear closure.  Given the location of the remaining defect, shape of the defect and the proximity to free margins a rotation flap was deemed most appropriate for complete closure of the defect.  Using a sterile surgical marker, an appropriate advancement flap was drawn incorporating the defect and placing the expected incisions within the relaxed skin tension lines where possible.    The area thus outlined was incised deep to adipose tissue with a #15 scalpel blade.  The skin margins were undermined to an appropriate distance in all directions utilizing iris scissors. Complex Repair And Rhombic Flap Text: The defect edges were debeveled with a #15 scalpel blade.  The primary defect was closed partially with a complex linear closure.  Given the location of the remaining defect, shape of the defect and the proximity to free margins a rhombic flap was deemed most appropriate for complete closure of the defect.  Using a sterile surgical marker, an appropriate advancement flap was drawn incorporating the defect and placing the expected incisions within the relaxed skin tension lines where possible.    The area thus outlined was incised deep to adipose tissue with a #15 scalpel blade.  The skin margins were undermined to an appropriate distance in all directions utilizing iris scissors. Complex Repair And Transposition Flap Text: The defect edges were debeveled with a #15 scalpel blade.  The primary defect was closed partially with a complex linear closure.  Given the location of the remaining defect, shape of the defect and the proximity to free margins a transposition flap was deemed most appropriate for complete closure of the defect.  Using a sterile surgical marker, an appropriate advancement flap was drawn incorporating the defect and placing the expected incisions within the relaxed skin tension lines where possible.    The area thus outlined was incised deep to adipose tissue with a #15 scalpel blade.  The skin margins were undermined to an appropriate distance in all directions utilizing iris scissors. Complex Repair And V-Y Plasty Text: The defect edges were debeveled with a #15 scalpel blade.  The primary defect was closed partially with a complex linear closure.  Given the location of the remaining defect, shape of the defect and the proximity to free margins a V-Y plasty was deemed most appropriate for complete closure of the defect.  Using a sterile surgical marker, an appropriate advancement flap was drawn incorporating the defect and placing the expected incisions within the relaxed skin tension lines where possible.    The area thus outlined was incised deep to adipose tissue with a #15 scalpel blade.  The skin margins were undermined to an appropriate distance in all directions utilizing iris scissors. Complex Repair And M Plasty Text: The defect edges were debeveled with a #15 scalpel blade.  The primary defect was closed partially with a complex linear closure.  Given the location of the remaining defect, shape of the defect and the proximity to free margins an M plasty was deemed most appropriate for complete closure of the defect.  Using a sterile surgical marker, an appropriate advancement flap was drawn incorporating the defect and placing the expected incisions within the relaxed skin tension lines where possible.    The area thus outlined was incised deep to adipose tissue with a #15 scalpel blade.  The skin margins were undermined to an appropriate distance in all directions utilizing iris scissors. Complex Repair And Double M Plasty Text: The defect edges were debeveled with a #15 scalpel blade.  The primary defect was closed partially with a complex linear closure.  Given the location of the remaining defect, shape of the defect and the proximity to free margins a double M plasty was deemed most appropriate for complete closure of the defect.  Using a sterile surgical marker, an appropriate advancement flap was drawn incorporating the defect and placing the expected incisions within the relaxed skin tension lines where possible.    The area thus outlined was incised deep to adipose tissue with a #15 scalpel blade.  The skin margins were undermined to an appropriate distance in all directions utilizing iris scissors. Complex Repair And W Plasty Text: The defect edges were debeveled with a #15 scalpel blade.  The primary defect was closed partially with a complex linear closure.  Given the location of the remaining defect, shape of the defect and the proximity to free margins a W plasty was deemed most appropriate for complete closure of the defect.  Using a sterile surgical marker, an appropriate advancement flap was drawn incorporating the defect and placing the expected incisions within the relaxed skin tension lines where possible.    The area thus outlined was incised deep to adipose tissue with a #15 scalpel blade.  The skin margins were undermined to an appropriate distance in all directions utilizing iris scissors. Complex Repair And Z Plasty Text: The defect edges were debeveled with a #15 scalpel blade.  The primary defect was closed partially with a complex linear closure.  Given the location of the remaining defect, shape of the defect and the proximity to free margins a Z plasty was deemed most appropriate for complete closure of the defect.  Using a sterile surgical marker, an appropriate advancement flap was drawn incorporating the defect and placing the expected incisions within the relaxed skin tension lines where possible.    The area thus outlined was incised deep to adipose tissue with a #15 scalpel blade.  The skin margins were undermined to an appropriate distance in all directions utilizing iris scissors. Complex Repair And Dorsal Nasal Flap Text: The defect edges were debeveled with a #15 scalpel blade.  The primary defect was closed partially with a complex linear closure.  Given the location of the remaining defect, shape of the defect and the proximity to free margins a dorsal nasal flap was deemed most appropriate for complete closure of the defect.  Using a sterile surgical marker, an appropriate flap was drawn incorporating the defect and placing the expected incisions within the relaxed skin tension lines where possible.    The area thus outlined was incised deep to adipose tissue with a #15 scalpel blade.  The skin margins were undermined to an appropriate distance in all directions utilizing iris scissors. Complex Repair And Ftsg Text: The defect edges were debeveled with a #15 scalpel blade.  The primary defect was closed partially with a complex linear closure.  Given the location of the defect, shape of the defect and the proximity to free margins a full thickness skin graft was deemed most appropriate to repair the remaining defect.  The graft was trimmed to fit the size of the remaining defect.  The graft was then placed in the primary defect, oriented appropriately, and sutured into place. Complex Repair And Burow's Graft Text: The defect edges were debeveled with a #15 scalpel blade.  The primary defect was closed partially with a complex linear closure.  Given the location of the defect, shape of the defect, the proximity to free margins and the presence of a standing cone deformity a Burow's graft was deemed most appropriate to repair the remaining defect.  The graft was trimmed to fit the size of the remaining defect.  The graft was then placed in the primary defect, oriented appropriately, and sutured into place. Complex Repair And Split-Thickness Skin Graft Text: The defect edges were debeveled with a #15 scalpel blade.  The primary defect was closed partially with a complex linear closure.  Given the location of the defect, shape of the defect and the proximity to free margins a split thickness skin graft was deemed most appropriate to repair the remaining defect.  The graft was trimmed to fit the size of the remaining defect.  The graft was then placed in the primary defect, oriented appropriately, and sutured into place. Complex Repair And Epidermal Autograft Text: The defect edges were debeveled with a #15 scalpel blade.  The primary defect was closed partially with a complex linear closure.  Given the location of the defect, shape of the defect and the proximity to free margins an epidermal autograft was deemed most appropriate to repair the remaining defect.  The graft was trimmed to fit the size of the remaining defect.  The graft was then placed in the primary defect, oriented appropriately, and sutured into place. Complex Repair And Dermal Autograft Text: The defect edges were debeveled with a #15 scalpel blade.  The primary defect was closed partially with a complex linear closure.  Given the location of the defect, shape of the defect and the proximity to free margins an dermal autograft was deemed most appropriate to repair the remaining defect.  The graft was trimmed to fit the size of the remaining defect.  The graft was then placed in the primary defect, oriented appropriately, and sutured into place. Complex Repair And Tissue Cultured Epidermal Autograft Text: The defect edges were debeveled with a #15 scalpel blade.  The primary defect was closed partially with a complex linear closure.  Given the location of the defect, shape of the defect and the proximity to free margins an tissue cultured epidermal autograft was deemed most appropriate to repair the remaining defect.  The graft was trimmed to fit the size of the remaining defect.  The graft was then placed in the primary defect, oriented appropriately, and sutured into place. Complex Repair And Xenograft Text: The defect edges were debeveled with a #15 scalpel blade.  The primary defect was closed partially with a complex linear closure.  Given the location of the defect, shape of the defect and the proximity to free margins a xenograft was deemed most appropriate to repair the remaining defect.  The graft was trimmed to fit the size of the remaining defect.  The graft was then placed in the primary defect, oriented appropriately, and sutured into place. Complex Repair And Skin Substitute Graft Text: The defect edges were debeveled with a #15 scalpel blade.  The primary defect was closed partially with a complex linear closure.  Given the location of the remaining defect, shape of the defect and the proximity to free margins a skin substitute graft was deemed most appropriate to repair the remaining defect.  The graft was trimmed to fit the size of the remaining defect.  The graft was then placed in the primary defect, oriented appropriately, and sutured into place. Include Anticoagulation In Mohs Note?: Please Select the Appropriate Response Path Notes (To The Dermatopathologist): Please check margins. Consent was obtained from the patient. The risks and benefits to therapy were discussed in detail. Specifically, the risks of infection, scarring, bleeding, prolonged wound healing, incomplete removal, allergy to anesthesia, nerve injury and recurrence were addressed. Prior to the procedure, the treatment site was clearly identified and confirmed by the patient. All components of Universal Protocol/PAUSE Rule completed. Post-Care Instructions: I reviewed with the patient in detail post-care instructions. Patient is not to engage in any heavy lifting, exercise, or swimming for the next 14 days. Should the patient develop any fevers, chills, bleeding, severe pain patient will contact the office immediately. Home Suture Removal Text: Patient was provided a home suture removal kit and will remove their sutures at home.  If they have any questions or difficulties they will call the office. Where Do You Want The Question To Include Opioid Counseling Located?: Case Summary Tab Billing Type: Third-Party Bill Information: Selecting Yes will display possible errors in your note based on the variables you have selected. This validation is only offered as a suggestion for you. PLEASE NOTE THAT THE VALIDATION TEXT WILL BE REMOVED WHEN YOU FINALIZE YOUR NOTE. IF YOU WANT TO FAX A PRELIMINARY NOTE YOU WILL NEED TO TOGGLE THIS TO 'NO' IF YOU DO NOT WANT IT IN YOUR FAXED NOTE.

## 2025-02-16 NOTE — PATIENT PROFILE ADULT - TOBACCO USE
Problem: SKIN/TISSUE INTEGRITY - ADULT  Goal: Skin Integrity remains intact(Skin Breakdown Prevention)  Description: Assess:  -Perform Tani assessment every 2  -Clean and moisturize skin every incont. Episode   -Inspect skin when repositioning, toileting, and assisting with ADLS  -Assess extremities for adequate circulation and sensation     Bed Management:  -Have minimal linens on bed & keep smooth, unwrinkled  -Change linens as needed when moist or perspiring  -Avoid sitting or lying in one position for more than 2 hours while in bed  -Keep HOB at 30 degrees     Toileting:  -Offer bedside commode  -Assess for incontinence every shift  -Use incontinent care products after each incontinent episode such as alejandra wipes    Activity:  -Mobilize patient 3 times a day  -Encourage activity and walks on unit  -Encourage or provide ROM exercises   -Turn and reposition patient every 2 Hours  -Use appropriate equipment to lift or move patient in bed  -Instruct/ Assist with weight shifting every 60 mins when out of bed in chair  -Consider limitation of chair time 6 hour intervals    Skin Care:  -Avoid use of baby powder, tape, friction and shearing, hot water or constrictive clothing  -Relieve pressure over bony prominences using foam wedges/pillows  -Do not massage red bony areas    Outcome: Progressing     Problem: MUSCULOSKELETAL - ADULT  Goal: Maintain or return mobility to safest level of function  Description: INTERVENTIONS:  - Assess patient's ability to carry out ADLs; assess patient's baseline for ADL function and identify physical deficits which impact ability to perform ADLs (bathing, care of mouth/teeth, toileting, grooming, dressing, etc.)  - Assess/evaluate cause of self-care deficits   - Assess range of motion  - Assess patient's mobility  - Assess patient's need for assistive devices and provide as appropriate  - Encourage maximum independence but intervene and supervise when necessary  - Involve family in  performance of ADLs  - Assess for home care needs following discharge   - Consider OT consult to assist with ADL evaluation and planning for discharge  - Provide patient education as appropriate  Outcome: Progressing     Problem: PAIN - ADULT  Goal: Verbalizes/displays adequate comfort level or baseline comfort level  Description: Interventions:  - Encourage patient to monitor pain and request assistance  - Assess pain using appropriate pain scale  - Administer analgesics based on type and severity of pain and evaluate response  - Implement non-pharmacological measures as appropriate and evaluate response  - Consider cultural and social influences on pain and pain management  - Notify physician/advanced practitioner if interventions unsuccessful or patient reports new pain  Outcome: Progressing     Problem: INFECTION - ADULT  Goal: Absence or prevention of progression during hospitalization  Description: INTERVENTIONS:  - Assess and monitor for signs and symptoms of infection  - Monitor lab/diagnostic results  - Monitor all insertion sites, i.e. indwelling lines, tubes, and drains  - Monitor endotracheal if appropriate and nasal secretions for changes in amount and color  - [place] appropriate cooling/warming therapies per order  - Administer medications as ordered  - Instruct and encourage patient and family to use good hand hygiene technique  - Identify and instruct in appropriate isolation precautions for identified infection/condition  Outcome: Progressing     Problem: SAFETY ADULT  Goal: Patient will remain free of falls  Description: INTERVENTIONS:  - Educate patient/family on patient safety including physical limitations  - Instruct patient to call for assistance with activity   - Consult OT/PT to assist with strengthening/mobility   - Keep Call bell within reach  - Keep bed low and locked with side rails adjusted as appropriate  - Keep care items and personal belongings within reach  - Initiate and  maintain comfort rounds  - Make Fall Risk Sign visible to staff  - Offer Toileting every 2 Hours, in advance of need  - Initiate/Maintain bed/chair alarm  - Obtain necessary fall risk management equipment: non skid socks  - Apply yellow socks and bracelet for high fall risk patients  - Consider moving patient to room near nurses station  Outcome: Progressing  Goal: Maintain or return to baseline ADL function  Description: INTERVENTIONS:  -  Assess patient's ability to carry out ADLs; assess patient's baseline for ADL function and identify physical deficits which impact ability to perform ADLs (bathing, care of mouth/teeth, toileting, grooming, dressing, etc.)  - Assess/evaluate cause of self-care deficits   - Assess range of motion  - Assess patient's mobility; develop plan if impaired  - Assess patient's need for assistive devices and provide as appropriate  - Encourage maximum independence but intervene and supervise when necessary  - Involve family in performance of ADLs  - Assess for home care needs following discharge   - Consider OT consult to assist with ADL evaluation and planning for discharge  - Provide patient education as appropriate  Outcome: Progressing  Goal: Maintains/Returns to pre admission functional level  Description: INTERVENTIONS:  - Perform AM-PAC 6 Click Basic Mobility/ Daily Activity assessment daily.  - Set and communicate daily mobility goal to care team and patient/family/caregiver.   - Collaborate with rehabilitation services on mobility goals if consulted  - Perform Range of Motion 3 times a day.  - Reposition patient every 2 hours.  - Dangle patient 3 times a day  - Stand patient 3 times a day  - Ambulate patient 3 times a day  - Out of bed to chair 3 times a day   - Out of bed for meals 3 times a day  - Out of bed for toileting  - Record patient progress and toleration of activity level   Outcome: Progressing     Problem: DISCHARGE PLANNING  Goal: Discharge to home or other facility  with appropriate resources  Description: INTERVENTIONS:  - Identify barriers to discharge w/patient and caregiver  - Arrange for needed discharge resources and transportation as appropriate  - Identify discharge learning needs (meds, wound care, etc.)  - Arrange for interpretive services to assist at discharge as needed  - Refer to Case Management Department for coordinating discharge planning if the patient needs post-hospital services based on physician/advanced practitioner order or complex needs related to functional status, cognitive ability, or social support system  Outcome: Progressing     Problem: Knowledge Deficit  Goal: Patient/family/caregiver demonstrates understanding of disease process, treatment plan, medications, and discharge instructions  Description: Complete learning assessment and assess knowledge base.  Interventions:  - Provide teaching at level of understanding  - Provide teaching via preferred learning methods  Outcome: Progressing     Problem: Prexisting or High Potential for Compromised Skin Integrity  Goal: Skin integrity is maintained or improved  Description: INTERVENTIONS:  - Identify patients at risk for skin breakdown  - Assess and monitor skin integrity  - Assess and monitor nutrition and hydration status  - Monitor labs   - Assess for incontinence   - Turn and reposition patient  - Assist with mobility/ambulation  - Relieve pressure over bony prominences  - Avoid friction and shearing  - Provide appropriate hygiene as needed including keeping skin clean and dry  - Evaluate need for skin moisturizer/barrier cream  - Collaborate with interdisciplinary team   - Patient/family teaching  - Consider wound care consult   Outcome: Progressing     Problem: GASTROINTESTINAL - ADULT  Goal: Maintains or returns to baseline bowel function  Description: INTERVENTIONS:  - Assess bowel function  - Encourage oral fluids to ensure adequate hydration  - Administer IV fluids if ordered to ensure  adequate hydration  - Administer ordered medications as needed  - Encourage mobilization and activity  - Consider nutritional services referral to assist patient with adequate nutrition and appropriate food choices  Outcome: Progressing     Problem: Nutrition/Hydration-ADULT  Goal: Nutrient/Hydration intake appropriate for improving, restoring or maintaining nutritional needs  Description: Monitor and assess patient's nutrition/hydration status for malnutrition. Collaborate with interdisciplinary team and initiate plan and interventions as ordered.  Monitor patient's weight and dietary intake as ordered or per policy. Utilize nutrition screening tool and intervene as necessary. Determine patient's food preferences and provide high-protein, high-caloric foods as appropriate.     INTERVENTIONS:  - Monitor oral intake, urinary output, labs, and treatment plans  - Assess nutrition and hydration status and recommend course of action  - Evaluate amount of meals eaten  - Assist patient with eating if necessary   - Allow adequate time for meals  - Recommend/ encourage appropriate diets, oral nutritional supplements, and vitamin/mineral supplements  - Order, calculate, and assess calorie counts as needed  - Recommend, monitor, and adjust tube feedings and TPN/PPN based on assessed needs  - Assess need for intravenous fluids  - Provide specific nutrition/hydration education as appropriate  - Include patient/family/caregiver in decisions related to nutrition  Outcome: Progressing      Current some day smoker